# Patient Record
Sex: MALE | Race: WHITE | NOT HISPANIC OR LATINO | URBAN - METROPOLITAN AREA
[De-identification: names, ages, dates, MRNs, and addresses within clinical notes are randomized per-mention and may not be internally consistent; named-entity substitution may affect disease eponyms.]

---

## 2023-02-15 PROBLEM — R73.01 IMPAIRED FASTING GLUCOSE: Status: ACTIVE | Noted: 2023-02-15

## 2023-02-15 PROBLEM — K22.70 BARRETT'S ESOPHAGUS: Status: ACTIVE | Noted: 2023-02-15

## 2023-02-15 PROBLEM — R68.84 JAW PAIN: Status: ACTIVE | Noted: 2023-02-15

## 2023-02-15 PROBLEM — M77.12 LATERAL EPICONDYLITIS OF LEFT ELBOW: Status: ACTIVE | Noted: 2023-02-15

## 2023-02-15 PROBLEM — M27.2: Status: ACTIVE | Noted: 2023-02-15

## 2023-02-15 PROBLEM — M19.90 OA (OSTEOARTHRITIS): Status: ACTIVE | Noted: 2023-02-15

## 2023-02-15 PROBLEM — E66.3 OVERWEIGHT WITH BODY MASS INDEX (BMI) OF 25 TO 25.9 IN ADULT: Status: ACTIVE | Noted: 2023-02-15

## 2023-02-15 PROBLEM — S09.90XA CLOSED HEAD INJURY: Status: ACTIVE | Noted: 2023-02-15

## 2023-02-15 PROBLEM — E78.5 HYPERLIPIDEMIA: Status: ACTIVE | Noted: 2023-02-15

## 2023-02-15 PROBLEM — M25.511 RIGHT SHOULDER PAIN: Status: ACTIVE | Noted: 2023-02-15

## 2023-02-15 RX ORDER — CHOLECALCIFEROL (VITAMIN D3) 25 MCG
2 TABLET ORAL DAILY
COMMUNITY
Start: 2015-07-07

## 2023-02-15 RX ORDER — OMEPRAZOLE 40 MG/1
1 CAPSULE, DELAYED RELEASE ORAL
COMMUNITY
End: 2023-03-30 | Stop reason: SDUPTHER

## 2023-02-15 RX ORDER — OMEGA-3-ACID ETHYL ESTERS 1 G/1
4 CAPSULE, LIQUID FILLED ORAL DAILY
COMMUNITY
Start: 2019-05-15 | End: 2023-04-04 | Stop reason: ALTCHOICE

## 2023-02-15 RX ORDER — IBUPROFEN 600 MG/1
1 TABLET ORAL 2 TIMES DAILY
COMMUNITY
End: 2023-04-04 | Stop reason: ALTCHOICE

## 2023-02-15 RX ORDER — ATORVASTATIN CALCIUM 80 MG/1
1 TABLET, FILM COATED ORAL DAILY
COMMUNITY
Start: 2017-01-09 | End: 2024-02-01 | Stop reason: SDUPTHER

## 2023-02-17 PROBLEM — E03.9 HYPOTHYROIDISM: Status: ACTIVE | Noted: 2023-02-17

## 2023-03-28 ENCOUNTER — TELEPHONE (OUTPATIENT)
Dept: PRIMARY CARE | Facility: CLINIC | Age: 68
End: 2023-03-28

## 2023-03-28 ENCOUNTER — APPOINTMENT (OUTPATIENT)
Dept: PRIMARY CARE | Facility: CLINIC | Age: 68
End: 2023-03-28
Payer: MEDICARE

## 2023-03-28 NOTE — TELEPHONE ENCOUNTER
Patient has medicare wellness appointment on Tuesday 4/4/23 at 3pm and would like to get blood work done on Monday can you order? He would like to get it done so Dr. Pace can review during his appointment.

## 2023-03-30 ENCOUNTER — TELEPHONE (OUTPATIENT)
Dept: PRIMARY CARE | Facility: CLINIC | Age: 68
End: 2023-03-30
Payer: MEDICARE

## 2023-03-30 DIAGNOSIS — K22.70 BARRETT'S ESOPHAGUS WITHOUT DYSPLASIA: ICD-10-CM

## 2023-03-30 RX ORDER — OMEPRAZOLE 40 MG/1
40 CAPSULE, DELAYED RELEASE ORAL
Qty: 90 CAPSULE | Refills: 3 | Status: SHIPPED | OUTPATIENT
Start: 2023-03-30 | End: 2023-04-04 | Stop reason: SDUPTHER

## 2023-04-04 ENCOUNTER — OFFICE VISIT (OUTPATIENT)
Dept: PRIMARY CARE | Facility: CLINIC | Age: 68
End: 2023-04-04
Payer: MEDICARE

## 2023-04-04 ENCOUNTER — APPOINTMENT (OUTPATIENT)
Dept: LAB | Facility: LAB | Age: 68
End: 2023-04-04
Payer: MEDICARE

## 2023-04-04 VITALS
HEART RATE: 92 BPM | TEMPERATURE: 97.3 F | WEIGHT: 169 LBS | SYSTOLIC BLOOD PRESSURE: 130 MMHG | OXYGEN SATURATION: 97 % | DIASTOLIC BLOOD PRESSURE: 69 MMHG | HEIGHT: 69 IN | BODY MASS INDEX: 25.03 KG/M2

## 2023-04-04 DIAGNOSIS — E55.9 VITAMIN D DEFICIENCY: ICD-10-CM

## 2023-04-04 DIAGNOSIS — K22.70 BARRETT'S ESOPHAGUS WITHOUT DYSPLASIA: ICD-10-CM

## 2023-04-04 DIAGNOSIS — N40.0 BENIGN PROSTATIC HYPERPLASIA WITHOUT LOWER URINARY TRACT SYMPTOMS: ICD-10-CM

## 2023-04-04 DIAGNOSIS — R73.01 IMPAIRED FASTING GLUCOSE: ICD-10-CM

## 2023-04-04 DIAGNOSIS — R73.9 HYPERGLYCEMIA: ICD-10-CM

## 2023-04-04 DIAGNOSIS — E78.2 MIXED HYPERLIPIDEMIA: ICD-10-CM

## 2023-04-04 DIAGNOSIS — R25.1 TREMOR: Primary | ICD-10-CM

## 2023-04-04 PROBLEM — S09.90XA CLOSED HEAD INJURY: Status: RESOLVED | Noted: 2023-02-15 | Resolved: 2023-04-04

## 2023-04-04 PROBLEM — R68.84 JAW PAIN: Status: RESOLVED | Noted: 2023-02-15 | Resolved: 2023-04-04

## 2023-04-04 PROBLEM — M25.511 RIGHT SHOULDER PAIN: Status: RESOLVED | Noted: 2023-02-15 | Resolved: 2023-04-04

## 2023-04-04 PROBLEM — M27.2: Status: RESOLVED | Noted: 2023-02-15 | Resolved: 2023-04-04

## 2023-04-04 PROBLEM — M77.12 LATERAL EPICONDYLITIS OF LEFT ELBOW: Status: RESOLVED | Noted: 2023-02-15 | Resolved: 2023-04-04

## 2023-04-04 PROBLEM — E66.3 OVERWEIGHT WITH BODY MASS INDEX (BMI) OF 25 TO 25.9 IN ADULT: Status: RESOLVED | Noted: 2023-02-15 | Resolved: 2023-04-04

## 2023-04-04 LAB
CALCIDIOL (25 OH VITAMIN D3) (NG/ML) IN SER/PLAS: 42 NG/ML
ERYTHROCYTE DISTRIBUTION WIDTH (RATIO) BY AUTOMATED COUNT: 14.2 % (ref 11.5–14.5)
ERYTHROCYTE MEAN CORPUSCULAR HEMOGLOBIN CONCENTRATION (G/DL) BY AUTOMATED: 32.9 G/DL (ref 32–36)
ERYTHROCYTE MEAN CORPUSCULAR VOLUME (FL) BY AUTOMATED COUNT: 95 FL (ref 80–100)
ERYTHROCYTES (10*6/UL) IN BLOOD BY AUTOMATED COUNT: 4.57 X10E12/L (ref 4.5–5.9)
HEMATOCRIT (%) IN BLOOD BY AUTOMATED COUNT: 43.5 % (ref 41–52)
HEMOGLOBIN (G/DL) IN BLOOD: 14.3 G/DL (ref 13.5–17.5)
LEUKOCYTES (10*3/UL) IN BLOOD BY AUTOMATED COUNT: 9 X10E9/L (ref 4.4–11.3)
NRBC (PER 100 WBCS) BY AUTOMATED COUNT: 0 /100 WBC (ref 0–0)
PLATELETS (10*3/UL) IN BLOOD AUTOMATED COUNT: 231 X10E9/L (ref 150–450)
PROSTATE SPECIFIC ANTIGEN,SCREEN: 1.71 NG/ML (ref 0–4)
THYROTROPIN (MIU/L) IN SER/PLAS BY DETECTION LIMIT <= 0.05 MIU/L: 2.8 MIU/L (ref 0.44–3.98)

## 2023-04-04 PROCEDURE — 99497 ADVNCD CARE PLAN 30 MIN: CPT | Performed by: INTERNAL MEDICINE

## 2023-04-04 PROCEDURE — 80061 LIPID PANEL: CPT

## 2023-04-04 PROCEDURE — 84443 ASSAY THYROID STIM HORMONE: CPT

## 2023-04-04 PROCEDURE — G0444 DEPRESSION SCREEN ANNUAL: HCPCS | Performed by: INTERNAL MEDICINE

## 2023-04-04 PROCEDURE — 1157F ADVNC CARE PLAN IN RCRD: CPT | Performed by: INTERNAL MEDICINE

## 2023-04-04 PROCEDURE — 1170F FXNL STATUS ASSESSED: CPT | Performed by: INTERNAL MEDICINE

## 2023-04-04 PROCEDURE — 80053 COMPREHEN METABOLIC PANEL: CPT

## 2023-04-04 PROCEDURE — 90677 PCV20 VACCINE IM: CPT | Performed by: INTERNAL MEDICINE

## 2023-04-04 PROCEDURE — 1160F RVW MEDS BY RX/DR IN RCRD: CPT | Performed by: INTERNAL MEDICINE

## 2023-04-04 PROCEDURE — G0439 PPPS, SUBSEQ VISIT: HCPCS | Performed by: INTERNAL MEDICINE

## 2023-04-04 PROCEDURE — 83036 HEMOGLOBIN GLYCOSYLATED A1C: CPT

## 2023-04-04 PROCEDURE — 1159F MED LIST DOCD IN RCRD: CPT | Performed by: INTERNAL MEDICINE

## 2023-04-04 PROCEDURE — 82306 VITAMIN D 25 HYDROXY: CPT

## 2023-04-04 PROCEDURE — 85027 COMPLETE CBC AUTOMATED: CPT

## 2023-04-04 PROCEDURE — G0009 ADMIN PNEUMOCOCCAL VACCINE: HCPCS | Performed by: INTERNAL MEDICINE

## 2023-04-04 PROCEDURE — 99213 OFFICE O/P EST LOW 20 MIN: CPT | Performed by: INTERNAL MEDICINE

## 2023-04-04 PROCEDURE — G0103 PSA SCREENING: HCPCS

## 2023-04-04 PROCEDURE — 1036F TOBACCO NON-USER: CPT | Performed by: INTERNAL MEDICINE

## 2023-04-04 RX ORDER — AMITRIPTYLINE HYDROCHLORIDE 10 MG/1
10 TABLET, FILM COATED ORAL NIGHTLY
Qty: 30 TABLET | Refills: 11 | Status: SHIPPED | OUTPATIENT
Start: 2023-04-04 | End: 2023-04-06 | Stop reason: SDUPTHER

## 2023-04-04 RX ORDER — OMEPRAZOLE 40 MG/1
40 CAPSULE, DELAYED RELEASE ORAL
Qty: 90 CAPSULE | Refills: 3 | Status: SHIPPED | OUTPATIENT
Start: 2023-04-04 | End: 2023-04-04 | Stop reason: ALTCHOICE

## 2023-04-04 RX ORDER — OMEGA-3 FATTY ACIDS 1000 MG
4000 CAPSULE ORAL
COMMUNITY
Start: 2019-11-25 | End: 2023-09-08 | Stop reason: ALTCHOICE

## 2023-04-04 ASSESSMENT — ACTIVITIES OF DAILY LIVING (ADL)
BATHING: INDEPENDENT
DRESSING: INDEPENDENT
DOING_HOUSEWORK: INDEPENDENT
MANAGING_FINANCES: INDEPENDENT
GROCERY_SHOPPING: INDEPENDENT
TAKING_MEDICATION: INDEPENDENT

## 2023-04-04 ASSESSMENT — ENCOUNTER SYMPTOMS
LOSS OF SENSATION IN FEET: 0
OCCASIONAL FEELINGS OF UNSTEADINESS: 0
DEPRESSION: 0

## 2023-04-04 ASSESSMENT — PATIENT HEALTH QUESTIONNAIRE - PHQ9
2. FEELING DOWN, DEPRESSED OR HOPELESS: NOT AT ALL
1. LITTLE INTEREST OR PLEASURE IN DOING THINGS: NOT AT ALL
2. FEELING DOWN, DEPRESSED OR HOPELESS: NOT AT ALL
SUM OF ALL RESPONSES TO PHQ9 QUESTIONS 1 AND 2: 0
SUM OF ALL RESPONSES TO PHQ9 QUESTIONS 1 AND 2: 0
1. LITTLE INTEREST OR PLEASURE IN DOING THINGS: NOT AT ALL

## 2023-04-04 NOTE — PROGRESS NOTES
Assessment and Plan:  Problem List Items Addressed This Visit          Digestive    Bartholomew's esophagus    Current Assessment & Plan     Continue Tums Mylanta omeprazole GI evaluation for H. pylori         Relevant Orders    CBC    Comprehensive Metabolic Panel       Endocrine/Metabolic    Impaired fasting glucose    Current Assessment & Plan     Low-carb diet check hemoglobin A1c         Relevant Orders    Comprehensive Metabolic Panel       Other    Hyperlipidemia    Current Assessment & Plan     Low-fat diet Lipitor         Relevant Orders    Comprehensive Metabolic Panel    Lipid Panel    Hemoglobin A1C    Tremor - Primary    Current Assessment & Plan     Multifactorial essential advised reviewed  Yoga exercise amitriptyline         Relevant Medications    amitriptyline (Elavil) 10 mg tablet    Other Relevant Orders    Comprehensive Metabolic Panel    TSH with reflex to Free T4 if abnormal     Other Visit Diagnoses       Vitamin D deficiency        Relevant Orders    Vitamin D, Total    Benign prostatic hyperplasia without lower urinary tract symptoms        Relevant Orders    Prostate Specific Antigen, Screen    Hyperglycemia        Relevant Orders    Hemoglobin A1C            Chief Complaint:   Medicare Wellness Exam/Comprehensive Problem Focused Follow Up and Physical Exam    HPI:  This is a 67-year-old patient  without any children does not smoke does not drink history of hypertension hyperlipidemia osteopenia osteoarthritis gastritis complaining of the tremor both upper extremity more on the left compared to right onset gradually duration 3 months progressed gradually aggravating factors stress underlying neuropathy Parkinson or anxiety cannot be rule out    Negative for multiple sclerosis    Negative for injury to the head or spine    Negative for fall    Negative for suicide    Negative for COVID-19  Active Problem List  Patient Active Problem List   Diagnosis    Bartholomew's esophagus     Hyperlipidemia    Impaired fasting glucose    OA (osteoarthritis)    Tremor       Comprehensive Medical/Surgical/Social/Family History  Past Medical History:   Diagnosis Date    Anesthesia of skin 02/18/2020    Numbness and tingling    Cellulitis of left finger 01/17/2022    Paronychia of left thumb    Disorder of thyroid, unspecified 06/01/2020    Thyroid mass    Encounter for immunization     Encounter for immunization    Encounter for other preprocedural examination 11/21/2019    Encounter for preadmission testing    Encounter for screening for cardiovascular disorders 05/13/2019    Encounter for abdominal aortic aneurysm (AAA) screening    Essential (primary) hypertension 07/23/2021    Benign essential hypertension    Generalized anxiety disorder 07/23/2021    ALVERTO (generalized anxiety disorder)    Local infection of the skin and subcutaneous tissue, unspecified 01/17/2022    Infection of thumb    Other chest pain 11/13/2018    Atypical chest pain    Personal history of other diseases of the digestive system 07/23/2021    History of colitis    Personal history of other diseases of the digestive system 12/17/2019    History of bilateral inguinal hernias    Personal history of other diseases of the digestive system 02/18/2020    History of diverticulitis of colon    Personal history of other diseases of the digestive system 01/17/2022    History of gastroesophageal reflux (GERD)    Personal history of other diseases of the musculoskeletal system and connective tissue 11/16/2018    History of muscle spasm    Personal history of other diseases of the respiratory system 02/18/2020    History of upper respiratory infection    Personal history of other diseases of the respiratory system     History of influenza    Personal history of other endocrine, nutritional and metabolic disease 02/18/2021    History of vitamin D deficiency    Personal history of other infectious and parasitic diseases 04/21/2020    History of  herpes zoster    Personal history of other mental and behavioral disorders 01/17/2022    History of anxiety disorder    Personal history of other specified conditions 08/03/2020    History of weight gain    Personal history of other specified conditions 05/15/2019    History of abnormal weight loss    Personal history of urinary calculi 02/18/2020    History of renal calculi    Sciatica, right side 09/10/2015    Right sided sciatica     Past Surgical History:   Procedure Laterality Date    OTHER SURGICAL HISTORY  11/04/2019    Colonoscopy    OTHER SURGICAL HISTORY  11/04/2019    Esophagogastroduodenoscopy    OTHER SURGICAL HISTORY  12/01/2022    Thyroid surgery    OTHER SURGICAL HISTORY  12/17/2019    Inguinal hernia repair     Social History     Social History Narrative    Not on file     Tobacco/Alcohol/Opioid use, as well as Illicit Drug Use was screened for/reviewed and documented in Social Documentation section of the chart and medication list as appropriate    Allergies and Medications  Patient has no known allergies.  Current Outpatient Medications   Medication Instructions    amitriptyline (ELAVIL) 10 mg, oral, Nightly    atorvastatin (Lipitor) 80 mg tablet 1 tablet, oral, Daily    cholecalciferol (Vitamin D-3) 25 MCG (1000 UT) tablet 2 tablets, oral, Daily    multivit with minerals/lutein (MULTIVITAMIN 50 PLUS ORAL) 1 tabs, ORAL, DAILY, in the afternoon, # 100 tabs, Refill(s) 0, Date: 06/03/2020 12:31:00 EDT    omega-3 4,000 mg     Medications and Supplements  prescribed by me and other practitioners or clinical pharmacist (such as prescriptions, OTC's, herbal therapies and supplements) were reviewed and documented in the medical record.          Advance directives  Advanced Care Planning (including a Living Will, Healthcare POA, as well as specific end of life choices and/or directives), was discussed for approximately 16 minutes with the patient and/or surrogate, voluntarily, and documented in the Problem  "List of the medical record.     Cardiac Risk Assessment  Cardiovascular risk was discussed and, if needed, lifestyle modifications recommended, including nutritional choices, exercise, and elimination of habits contributing to risk. We agreed on a plan to reduce the current cardiovascular risk based on above discussion as needed.  Aspirin use/disuse was discussed and documented in the Problem List of the medical record after reviewing the updated guidelines below:    Consider low dose Aspirin ( mg) use if the benefit for cardiovascular disease prevention outweighs risk for bleeding complications.   In general, low dose ASA should be considered:  In patients WITHOUT prior MI/stroke/PAD (primary prevention):   a. Age <60: Use if 10-year cardiovascular disease risk >20%, with discussion of risks and benefits with patient  b. Age 60-<70: Use if 10-year cardiovascular disease risk >20% and low bleeding (e.g., gastrointenstinal) risk, with discussion of risks and benefits with patient  c. Age >=70: Do not use    In patients WITH prior MI/stroke/PAD (secondary prevention):   Generally use unless extremely high bleeding (e.g., gastrointenstinal) risk, with discussion of risks and benefits with patient    ROS otherwise negative aside from what was mentioned above in HPI.    Vitals  /69 (BP Location: Right arm, Patient Position: Sitting, BP Cuff Size: Adult)   Pulse 92   Temp 36.3 °C (97.3 °F)   Ht 1.753 m (5' 9\")   Wt 76.7 kg (169 lb)   SpO2 97%   BMI 24.96 kg/m²   Body mass index is 24.96 kg/m².  Physical Exam  Gen: Alert, NAD  HEENT:  Unremarkable  Neck:  No FIDE  Respiratory:  Lungs CTAB  Cardiovascular:  Heart RRR  Neuro: Tremor of both upper extremity  Skin:  No suspicious lesions present  Breast: No masses, or axillary lymphadenopathy    During the course of the visit the patient was educated and counseled about age appropriate screening and preventive services. Completed preventive screenings were " documented in the chart and orders were placed for outstanding screenings/procedures as documented in the Assessment and Plan.    Patient Instructions (the written plan) was given to the patient at check out.

## 2023-04-05 LAB
ALANINE AMINOTRANSFERASE (SGPT) (U/L) IN SER/PLAS: 30 U/L (ref 10–52)
ALBUMIN (G/DL) IN SER/PLAS: 4.5 G/DL (ref 3.4–5)
ALKALINE PHOSPHATASE (U/L) IN SER/PLAS: 72 U/L (ref 33–136)
ANION GAP IN SER/PLAS: 18 MMOL/L (ref 10–20)
ASPARTATE AMINOTRANSFERASE (SGOT) (U/L) IN SER/PLAS: 20 U/L (ref 9–39)
BILIRUBIN TOTAL (MG/DL) IN SER/PLAS: 0.9 MG/DL (ref 0–1.2)
CALCIUM (MG/DL) IN SER/PLAS: 10.6 MG/DL (ref 8.6–10.6)
CARBON DIOXIDE, TOTAL (MMOL/L) IN SER/PLAS: 27 MMOL/L (ref 21–32)
CHLORIDE (MMOL/L) IN SER/PLAS: 100 MMOL/L (ref 98–107)
CHOLESTEROL (MG/DL) IN SER/PLAS: 217 MG/DL (ref 0–199)
CHOLESTEROL IN HDL (MG/DL) IN SER/PLAS: 40.7 MG/DL
CHOLESTEROL/HDL RATIO: 5.3
CREATININE (MG/DL) IN SER/PLAS: 0.82 MG/DL (ref 0.5–1.3)
ESTIMATED AVERAGE GLUCOSE FOR HBA1C: 108 MG/DL
GFR MALE: >90 ML/MIN/1.73M2
GLUCOSE (MG/DL) IN SER/PLAS: 96 MG/DL (ref 74–99)
HEMOGLOBIN A1C/HEMOGLOBIN TOTAL IN BLOOD: 5.4 %
LDL: ABNORMAL MG/DL (ref 0–99)
NON HDL CHOLESTEROL: 176 MG/DL
POTASSIUM (MMOL/L) IN SER/PLAS: 4.3 MMOL/L (ref 3.5–5.3)
PROTEIN TOTAL: 6.8 G/DL (ref 6.4–8.2)
SODIUM (MMOL/L) IN SER/PLAS: 141 MMOL/L (ref 136–145)
TRIGLYCERIDE (MG/DL) IN SER/PLAS: 449 MG/DL (ref 0–149)
UREA NITROGEN (MG/DL) IN SER/PLAS: 15 MG/DL (ref 6–23)
VLDL: ABNORMAL MG/DL (ref 0–40)

## 2023-04-06 DIAGNOSIS — K22.70 BARRETT'S ESOPHAGUS WITHOUT DYSPLASIA: ICD-10-CM

## 2023-04-06 DIAGNOSIS — R25.1 TREMOR: ICD-10-CM

## 2023-04-06 RX ORDER — AMITRIPTYLINE HYDROCHLORIDE 10 MG/1
10 TABLET, FILM COATED ORAL NIGHTLY
Qty: 30 TABLET | Refills: 11 | Status: SHIPPED | OUTPATIENT
Start: 2023-04-06 | End: 2023-06-27

## 2023-04-06 RX ORDER — OMEPRAZOLE 40 MG/1
40 CAPSULE, DELAYED RELEASE ORAL
Qty: 90 CAPSULE | Refills: 3 | Status: SHIPPED | OUTPATIENT
Start: 2023-04-06

## 2023-04-06 NOTE — TELEPHONE ENCOUNTER
----- Message from Gabriel Pace MD sent at 4/5/2023 11:43 AM EDT -----  Cholesterol 217 triglyceride 449 advised Crestor 20 mg a day #90 follow-up 3 months

## 2023-06-27 ENCOUNTER — OFFICE VISIT (OUTPATIENT)
Dept: PRIMARY CARE | Facility: CLINIC | Age: 68
End: 2023-06-27
Payer: MEDICARE

## 2023-06-27 VITALS
TEMPERATURE: 97.1 F | DIASTOLIC BLOOD PRESSURE: 73 MMHG | HEART RATE: 75 BPM | HEIGHT: 69 IN | SYSTOLIC BLOOD PRESSURE: 123 MMHG | BODY MASS INDEX: 25.03 KG/M2 | WEIGHT: 169 LBS | OXYGEN SATURATION: 97 %

## 2023-06-27 DIAGNOSIS — E78.2 MIXED HYPERLIPIDEMIA: Primary | ICD-10-CM

## 2023-06-27 DIAGNOSIS — R25.1 TREMOR: ICD-10-CM

## 2023-06-27 DIAGNOSIS — K22.710 BARRETT'S ESOPHAGUS WITH LOW GRADE DYSPLASIA: ICD-10-CM

## 2023-06-27 DIAGNOSIS — R73.01 IMPAIRED FASTING GLUCOSE: ICD-10-CM

## 2023-06-27 DIAGNOSIS — R73.9 HYPERGLYCEMIA: ICD-10-CM

## 2023-06-27 PROCEDURE — 1159F MED LIST DOCD IN RCRD: CPT | Performed by: INTERNAL MEDICINE

## 2023-06-27 PROCEDURE — 1157F ADVNC CARE PLAN IN RCRD: CPT | Performed by: INTERNAL MEDICINE

## 2023-06-27 PROCEDURE — 99213 OFFICE O/P EST LOW 20 MIN: CPT | Performed by: INTERNAL MEDICINE

## 2023-06-27 PROCEDURE — 1160F RVW MEDS BY RX/DR IN RCRD: CPT | Performed by: INTERNAL MEDICINE

## 2023-06-27 PROCEDURE — 1036F TOBACCO NON-USER: CPT | Performed by: INTERNAL MEDICINE

## 2023-06-27 NOTE — PROGRESS NOTES
Subjective   Patient ID: Gerald Castro is a 67 y.o. male who presents for Follow-up.    Assessment/Plan     Problem List Items Addressed This Visit          Digestive    Bartholomew's esophagus     EGD surveillance with GI            Endocrine/Metabolic    Impaired fasting glucose     Low-carb low-fat diet exercise statin therapy            Other    Hyperlipidemia - Primary     Continue Lipitor fish oil check LFT CPK         Relevant Orders    Hemoglobin A1C    Lipid Panel    Tremor     Amitriptyline does not help advised B12 folic acid thiamine alcohol.  Reviewed           Hyperglycemia    Relevant Orders    Hemoglobin A1C    Lipid Panel   Patient was evaluated today, problem list was reviewed, problems and concerns addressed, Rx list reviewed and updated, lab and tests were noted and reviewed. Life style changes were discussed, always it works better if we eat plant based diet and plenty of fibres and roughage. Consume adequate amount of water and avoid alcohol, light to moderate physical activities and stress reduction are always beneficial for ongoing physical well being. Do not forget to have 6 to 7 hours of sleep regularly and avoid late night dee screen exposure.      HPI 67-year-old patient have metabolic syndrome hyperglycemia hyperlipidemia gastritis chronic back pain chronic esophagitis continue tremor advised to try amitriptyline B12 folic acid thiamine not better advised to try the B12 folic acid thiamine and red wine problems continue advised to see neurology    No Known Allergies    Current Outpatient Medications   Medication Sig Dispense Refill    atorvastatin (Lipitor) 80 mg tablet Take 1 tablet (80 mg) by mouth once daily.      cholecalciferol (Vitamin D-3) 25 MCG (1000 UT) tablet Take 2 tablets (50 mcg) by mouth once daily.      multivit with minerals/lutein (MULTIVITAMIN 50 PLUS ORAL) 1 tabs, ORAL, DAILY, in the afternoon, # 100 tabs, Refill(s) 0, Date: 06/03/2020 12:31:00 EDT      omega-3 1,000  "mg capsule capsule 4 capsules (4,000 mg).      omeprazole (PriLOSEC) 40 mg DR capsule Take 1 capsule (40 mg) by mouth 4 times a week. 90 capsule 3     No current facility-administered medications for this visit.       Objective   Visit Vitals  /73 (BP Location: Left arm, Patient Position: Sitting, BP Cuff Size: Adult)   Pulse 75   Temp 36.2 °C (97.1 °F)   Ht 1.753 m (5' 9\")   Wt 76.7 kg (169 lb)   SpO2 97%   BMI 24.96 kg/m²   Smoking Status Former   BSA 1.93 m²     Physical Exam  Constitutional:       General: He is not in acute distress.     Appearance: Normal appearance.   HENT:      Head: Normocephalic and atraumatic.      Nose: Nose normal.   Eyes:      Extraocular Movements: Extraocular movements intact.      Conjunctiva/sclera: Conjunctivae normal.   Cardiovascular:      Rate and Rhythm: Normal rate and regular rhythm.   Pulmonary:      Effort: Pulmonary effort is normal.      Breath sounds: Normal breath sounds.   Skin:     General: Skin is warm.   Neurological:      Idiopathic tremor.   Psychiatric:         Generalized anxiety  Immunization History   Administered Date(s) Administered    Influenza, High Dose Seasonal, Preservative Free 10/13/2016, 10/17/2016    Influenza, High-dose Seasonal, Quadrivalent, Preservative Free 09/05/2021, 09/04/2022    Influenza, Injectable, MDCK, preservative free 10/17/2017    Influenza, injectable, quadrivalent, preservative free 09/11/2018, 09/17/2019    Influenza, seasonal, injectable 09/22/2013, 09/09/2014, 09/04/2022    Influenza, seasonal, injectable, preservative free 10/14/2008, 09/08/2015, 08/30/2016, 09/01/2020    Moderna SARS-CoV-2 Vaccination 01/12/2021, 02/10/2021, 02/12/2021, 03/12/2021, 11/02/2021, 04/07/2022, 09/11/2022    Moderna Sars-cov-2 Bivalent Booster 09/11/2022    Pneumococcal Conjugate Pcv 20 04/04/2023    Pneumococcal Polysaccharide PPSV23 02/18/2020       Review of Systems    Office Visit on 04/04/2023   Component Date Value Ref Range Status    " WBC 04/04/2023 9.0  4.4 - 11.3 x10E9/L Final    nRBC 04/04/2023 0.0  0.0 - 0.0 /100 WBC Final    RBC 04/04/2023 4.57  4.50 - 5.90 x10E12/L Final    Hemoglobin 04/04/2023 14.3  13.5 - 17.5 g/dL Final    Hematocrit 04/04/2023 43.5  41.0 - 52.0 % Final    MCV 04/04/2023 95  80 - 100 fL Final    MCHC 04/04/2023 32.9  32.0 - 36.0 g/dL Final    Platelets 04/04/2023 231  150 - 450 x10E9/L Final    RDW 04/04/2023 14.2  11.5 - 14.5 % Final    Glucose 04/04/2023 96  74 - 99 mg/dL Final    Sodium 04/04/2023 141  136 - 145 mmol/L Final    Potassium 04/04/2023 4.3  3.5 - 5.3 mmol/L Final    Chloride 04/04/2023 100  98 - 107 mmol/L Final    Bicarbonate 04/04/2023 27  21 - 32 mmol/L Final    Anion Gap 04/04/2023 18  10 - 20 mmol/L Final    Urea Nitrogen 04/04/2023 15  6 - 23 mg/dL Final    Creatinine 04/04/2023 0.82  0.50 - 1.30 mg/dL Final    GFR MALE 04/04/2023 >90  >90 mL/min/1.73m2 Final    Calcium 04/04/2023 10.6  8.6 - 10.6 mg/dL Final    Albumin 04/04/2023 4.5  3.4 - 5.0 g/dL Final    Alkaline Phosphatase 04/04/2023 72  33 - 136 U/L Final    Total Protein 04/04/2023 6.8  6.4 - 8.2 g/dL Final    AST 04/04/2023 20  9 - 39 U/L Final    Total Bilirubin 04/04/2023 0.9  0.0 - 1.2 mg/dL Final    ALT (SGPT) 04/04/2023 30  10 - 52 U/L Final    Cholesterol 04/04/2023 217 (H)  0 - 199 mg/dL Final    HDL 04/04/2023 40.7  mg/dL Final    Cholesterol/HDL Ratio 04/04/2023 5.3 (A)   Final    LDL 04/04/2023 -  0 - 99 mg/dL Final    VLDL 04/04/2023 SEE COMMENT  0 - 40 mg/dL Final    Triglycerides 04/04/2023 449 (H)  0 - 149 mg/dL Final    Non HDL Cholesterol 04/04/2023 176  mg/dL Final    TSH 04/04/2023 2.80  0.44 - 3.98 mIU/L Final    Vitamin D, 25-Hydroxy 04/04/2023 42  ng/mL Final    Prostate Specific Antigen,Screen 04/04/2023 1.71  0.00 - 4.00 ng/mL Final    Hemoglobin A1C 04/04/2023 5.4  % Final    Estimated Average Glucose 04/04/2023 108  MG/DL Final       Radiology: Reviewed imaging in powerchart.  No results found.    Family History    Problem Relation Name Age of Onset    Heart disease Mother      Hypertension Mother      Other (agent orange) Father       Social History     Socioeconomic History    Marital status: Single     Spouse name: None    Number of children: None    Years of education: None    Highest education level: None   Occupational History    None   Tobacco Use    Smoking status: Former     Types: Cigarettes     Quit date: 3/4/2019     Years since quittin.3    Smokeless tobacco: Never   Substance and Sexual Activity    Alcohol use: Never    Drug use: Defer    Sexual activity: None   Other Topics Concern    None   Social History Narrative    None     Social Determinants of Health     Financial Resource Strain: Not on file   Food Insecurity: Not on file   Transportation Needs: Not on file   Physical Activity: Not on file   Stress: Not on file   Social Connections: Not on file   Intimate Partner Violence: Not on file   Housing Stability: Not on file     Past Medical History:   Diagnosis Date    Anesthesia of skin 2020    Numbness and tingling    Cellulitis of left finger 2022    Paronychia of left thumb    Disorder of thyroid, unspecified 2020    Thyroid mass    Encounter for immunization     Encounter for immunization    Encounter for other preprocedural examination 2019    Encounter for preadmission testing    Encounter for screening for cardiovascular disorders 2019    Encounter for abdominal aortic aneurysm (AAA) screening    Essential (primary) hypertension 2021    Benign essential hypertension    Generalized anxiety disorder 2021    ALVERTO (generalized anxiety disorder)    Local infection of the skin and subcutaneous tissue, unspecified 2022    Infection of thumb    Other chest pain 2018    Atypical chest pain    Personal history of other diseases of the digestive system 2021    History of colitis    Personal history of other diseases of the digestive system 2019     History of bilateral inguinal hernias    Personal history of other diseases of the digestive system 02/18/2020    History of diverticulitis of colon    Personal history of other diseases of the digestive system 01/17/2022    History of gastroesophageal reflux (GERD)    Personal history of other diseases of the musculoskeletal system and connective tissue 11/16/2018    History of muscle spasm    Personal history of other diseases of the respiratory system 02/18/2020    History of upper respiratory infection    Personal history of other diseases of the respiratory system     History of influenza    Personal history of other endocrine, nutritional and metabolic disease 02/18/2021    History of vitamin D deficiency    Personal history of other infectious and parasitic diseases 04/21/2020    History of herpes zoster    Personal history of other mental and behavioral disorders 01/17/2022    History of anxiety disorder    Personal history of other specified conditions 08/03/2020    History of weight gain    Personal history of other specified conditions 05/15/2019    History of abnormal weight loss    Personal history of urinary calculi 02/18/2020    History of renal calculi    Sciatica, right side 09/10/2015    Right sided sciatica     Past Surgical History:   Procedure Laterality Date    OTHER SURGICAL HISTORY  11/04/2019    Colonoscopy    OTHER SURGICAL HISTORY  11/04/2019    Esophagogastroduodenoscopy    OTHER SURGICAL HISTORY  12/01/2022    Thyroid surgery    OTHER SURGICAL HISTORY  12/17/2019    Inguinal hernia repair       Charting was completed using voice recognition technology and may include unintended errors.

## 2023-07-11 ENCOUNTER — TELEPHONE (OUTPATIENT)
Dept: PRIMARY CARE | Facility: CLINIC | Age: 68
End: 2023-07-11
Payer: MEDICARE

## 2023-07-21 ENCOUNTER — APPOINTMENT (OUTPATIENT)
Dept: PRIMARY CARE | Facility: CLINIC | Age: 68
End: 2023-07-21
Payer: MEDICARE

## 2023-08-04 ENCOUNTER — TELEPHONE (OUTPATIENT)
Dept: PRIMARY CARE | Facility: CLINIC | Age: 68
End: 2023-08-04

## 2023-08-04 ENCOUNTER — OFFICE VISIT (OUTPATIENT)
Dept: PRIMARY CARE | Facility: CLINIC | Age: 68
End: 2023-08-04
Payer: MEDICARE

## 2023-08-04 VITALS
SYSTOLIC BLOOD PRESSURE: 116 MMHG | OXYGEN SATURATION: 97 % | DIASTOLIC BLOOD PRESSURE: 65 MMHG | HEIGHT: 69 IN | HEART RATE: 93 BPM | WEIGHT: 165 LBS | BODY MASS INDEX: 24.44 KG/M2 | TEMPERATURE: 97.4 F

## 2023-08-04 DIAGNOSIS — K43.6 VENTRAL HERNIA WITH OBSTRUCTION AND WITHOUT GANGRENE: ICD-10-CM

## 2023-08-04 DIAGNOSIS — R25.1 TREMOR: ICD-10-CM

## 2023-08-04 DIAGNOSIS — M19.90 ARTHRITIS: Primary | ICD-10-CM

## 2023-08-04 DIAGNOSIS — M77.12 EPICONDYLITIS, LATERAL, LEFT: ICD-10-CM

## 2023-08-04 PROBLEM — K43.9 VENTRAL HERNIA: Status: ACTIVE | Noted: 2023-08-04

## 2023-08-04 PROCEDURE — 1159F MED LIST DOCD IN RCRD: CPT | Performed by: INTERNAL MEDICINE

## 2023-08-04 PROCEDURE — 1157F ADVNC CARE PLAN IN RCRD: CPT | Performed by: INTERNAL MEDICINE

## 2023-08-04 PROCEDURE — 96372 THER/PROPH/DIAG INJ SC/IM: CPT | Performed by: INTERNAL MEDICINE

## 2023-08-04 PROCEDURE — 1036F TOBACCO NON-USER: CPT | Performed by: INTERNAL MEDICINE

## 2023-08-04 PROCEDURE — 99214 OFFICE O/P EST MOD 30 MIN: CPT | Performed by: INTERNAL MEDICINE

## 2023-08-04 PROCEDURE — 1160F RVW MEDS BY RX/DR IN RCRD: CPT | Performed by: INTERNAL MEDICINE

## 2023-08-04 RX ORDER — TRIAMCINOLONE ACETONIDE 40 MG/ML
40 INJECTION, SUSPENSION INTRA-ARTICULAR; INTRAMUSCULAR ONCE
Status: COMPLETED | OUTPATIENT
Start: 2023-08-04 | End: 2023-08-04

## 2023-08-04 RX ORDER — INDOMETHACIN 50 MG/1
CAPSULE ORAL
Qty: 10 CAPSULE | Refills: 0 | Status: SHIPPED | OUTPATIENT
Start: 2023-08-04 | End: 2023-09-08 | Stop reason: ALTCHOICE

## 2023-08-04 RX ADMIN — TRIAMCINOLONE ACETONIDE 40 MG: 40 INJECTION, SUSPENSION INTRA-ARTICULAR; INTRAMUSCULAR at 14:11

## 2023-08-04 NOTE — PROGRESS NOTES
Subjective   Patient ID: Gerald Castro is a 67 y.o. male who presents for Establish Care (Lump in stomach for awhile now /Lump left elbow still - xrays were done already still painful ).    Assessment/Plan     Problem List Items Addressed This Visit       Arthritis - Primary    Relevant Orders    Rheumatoid Factor    ANURAG without Reflex HENNA    C-Reactive Protein    Sedimentation Rate    Anti-DNA Antibody, Double-Stranded    Epicondylitis, lateral, left       Kenalog 40 mg injection to the left elbow without any complication         Tremor       Continue alcohol amitriptyline B12 folic acid         Ventral hernia     Surgical evaluation for possible surgery advised no weight lifting        Patient was evaluated today, problem list was reviewed, problems and concerns addressed, Rx list reviewed and updated, lab and tests were noted and reviewed. Life style changes were discussed, always it works better if we eat plant based diet and plenty of fibres and roughage. Consume adequate amount of water and avoid alcohol, light to moderate physical activities and stress reduction are always beneficial for ongoing physical well being. Do not forget to have 6 to 7 hours of sleep regularly and avoid late night dee screen exposure.      HPI this is a 67-year-old patient who had a history of hypertension hyperlipidemia and gastritis complaining of the acute on chronic left elbow pain onset gradually duration few weeks to months progress slowly aggravated by activity he is a left-handed person    Negative radiation of the pain to the wrist or elbow or weakness    Also complaining of the ventral hernia onset gradually duration few months progressed slowly aggravated by the lifting pulling pushing.  Negative for nausea vomiting diarrhea constipation  Past Medical History:   Diagnosis Date    Anesthesia of skin 02/18/2020    Numbness and tingling    Cellulitis of left finger 01/17/2022    Paronychia of left thumb    Disorder of  thyroid, unspecified 06/01/2020    Thyroid mass    Encounter for immunization     Encounter for immunization    Encounter for other preprocedural examination 11/21/2019    Encounter for preadmission testing    Encounter for screening for cardiovascular disorders 05/13/2019    Encounter for abdominal aortic aneurysm (AAA) screening    Essential (primary) hypertension 07/23/2021    Benign essential hypertension    Generalized anxiety disorder 07/23/2021    ALVERTO (generalized anxiety disorder)    Local infection of the skin and subcutaneous tissue, unspecified 01/17/2022    Infection of thumb    Other chest pain 11/13/2018    Atypical chest pain    Personal history of other diseases of the digestive system 07/23/2021    History of colitis    Personal history of other diseases of the digestive system 12/17/2019    History of bilateral inguinal hernias    Personal history of other diseases of the digestive system 02/18/2020    History of diverticulitis of colon    Personal history of other diseases of the digestive system 01/17/2022    History of gastroesophageal reflux (GERD)    Personal history of other diseases of the musculoskeletal system and connective tissue 11/16/2018    History of muscle spasm    Personal history of other diseases of the respiratory system 02/18/2020    History of upper respiratory infection    Personal history of other diseases of the respiratory system     History of influenza    Personal history of other endocrine, nutritional and metabolic disease 02/18/2021    History of vitamin D deficiency    Personal history of other infectious and parasitic diseases 04/21/2020    History of herpes zoster    Personal history of other mental and behavioral disorders 01/17/2022    History of anxiety disorder    Personal history of other specified conditions 08/03/2020    History of weight gain    Personal history of other specified conditions 05/15/2019    History of abnormal weight loss    Personal history  of urinary calculi 2020    History of renal calculi    Sciatica, right side 09/10/2015    Right sided sciatica     Past Surgical History:   Procedure Laterality Date    OTHER SURGICAL HISTORY  2019    Colonoscopy    OTHER SURGICAL HISTORY  2019    Esophagogastroduodenoscopy    OTHER SURGICAL HISTORY  2022    Thyroid surgery    OTHER SURGICAL HISTORY  2019    Inguinal hernia repair     No Known Allergies  Current Outpatient Medications   Medication Sig Dispense Refill    atorvastatin (Lipitor) 80 mg tablet Take 1 tablet (80 mg) by mouth once daily.      cholecalciferol (Vitamin D-3) 25 MCG (1000 UT) tablet Take 2 tablets (50 mcg) by mouth once daily.      multivit with minerals/lutein (MULTIVITAMIN 50 PLUS ORAL) 1 tabs, ORAL, DAILY, in the afternoon, # 100 tabs, Refill(s) 0, Date: 2020 12:31:00 EDT      omega-3 1,000 mg capsule capsule 4 capsules (4,000 mg).      omeprazole (PriLOSEC) 40 mg DR capsule Take 1 capsule (40 mg) by mouth 4 times a week. 90 capsule 3     No current facility-administered medications for this visit.     Family History   Problem Relation Name Age of Onset    Heart disease Mother      Hypertension Mother      Other (agent orange) Father       Social History     Socioeconomic History    Marital status: Single     Spouse name: None    Number of children: None    Years of education: None    Highest education level: None   Occupational History    None   Tobacco Use    Smoking status: Former     Types: Cigarettes     Quit date: 3/4/2019     Years since quittin.4    Smokeless tobacco: Never   Substance and Sexual Activity    Alcohol use: Never    Drug use: Defer    Sexual activity: None   Other Topics Concern    None   Social History Narrative    None     Social Determinants of Health     Financial Resource Strain: Not on file   Food Insecurity: Not on file   Transportation Needs: Not on file   Physical Activity: Not on file   Stress: Not on file   Social  "Connections: Not on file   Intimate Partner Violence: Not on file   Housing Stability: Not on file     Immunization History   Administered Date(s) Administered    Flu vaccine (IIV4), preservative free *Check age/dose* 09/11/2018, 09/17/2019    Flu vaccine, quadrivalent, high-dose, preservative free, age 65y+ (FLUZONE) 09/05/2021, 09/04/2022    Influenza, High Dose Seasonal, Preservative Free 10/13/2016, 10/17/2016    Influenza, Injectable, MDCK, preservative free 10/17/2017    Influenza, seasonal, injectable 09/22/2013, 09/09/2014, 09/04/2022    Influenza, seasonal, injectable, preservative free 10/14/2008, 09/08/2015, 08/30/2016, 09/01/2020    Moderna COVID-19 vaccine, bivalent, blue cap/gray label *Check age/dose* 09/11/2022    Moderna SARS-CoV-2 Vaccination 01/12/2021, 02/10/2021, 02/12/2021, 03/12/2021, 11/02/2021, 04/07/2022, 09/11/2022    Pneumococcal conjugate vaccine, 20-valent, adult (PREVNAR 20) 04/04/2023    Pneumococcal polysaccharide vaccine, 23-valent, age 2 years and older (PNEUMOVAX 23) 02/18/2020       Review of Systems  Review of systems is otherwise negative unless stated above or in history of present illness.    Objective   Visit Vitals  /65 (BP Location: Right arm, Patient Position: Sitting, BP Cuff Size: Adult)   Pulse 93   Temp 36.3 °C (97.4 °F)   Ht 1.753 m (5' 9\")   Wt 74.8 kg (165 lb)   SpO2 97%   BMI 24.37 kg/m²   Smoking Status Former   BSA 1.91 m²     Physical Exam  Constitutional:       General: not in acute distress.   HENT:      Head: Normocephalic and atraumatic.      Nose: Nose normal.   Eyes:      Extraocular Movements: Extraocular movements intact.      Conjunctiva/sclera: Conjunctivae normal.   Cardiovascular:      Rate and Rhythm: Normal rate ,  No M/R/G  Pulmonary:      Effort: Pulmonary effort is normal.      Breath sounds: Normal, Bilat Equal AE  Skin:     General: Skin is warm.   Neurological:      Mental Status: He is alert and oriented to person, place, and time. "   Psychiatric:         Mood and Affect: Mood normal.         Behavior: Behavior normal.   Musculoskeletal left elbow lateral epicondylitis tenderness or swelling  FROM in all extremitirs,  Joint-no swelling or tenderness  Abdomen examination ventral hernia nontender    No visits with results within 4 Month(s) from this visit.   Latest known visit with results is:   Office Visit on 04/04/2023   Component Date Value Ref Range Status    WBC 04/04/2023 9.0  4.4 - 11.3 x10E9/L Final    nRBC 04/04/2023 0.0  0.0 - 0.0 /100 WBC Final    RBC 04/04/2023 4.57  4.50 - 5.90 x10E12/L Final    Hemoglobin 04/04/2023 14.3  13.5 - 17.5 g/dL Final    Hematocrit 04/04/2023 43.5  41.0 - 52.0 % Final    MCV 04/04/2023 95  80 - 100 fL Final    MCHC 04/04/2023 32.9  32.0 - 36.0 g/dL Final    Platelets 04/04/2023 231  150 - 450 x10E9/L Final    RDW 04/04/2023 14.2  11.5 - 14.5 % Final    Glucose 04/04/2023 96  74 - 99 mg/dL Final    Sodium 04/04/2023 141  136 - 145 mmol/L Final    Potassium 04/04/2023 4.3  3.5 - 5.3 mmol/L Final    Chloride 04/04/2023 100  98 - 107 mmol/L Final    Bicarbonate 04/04/2023 27  21 - 32 mmol/L Final    Anion Gap 04/04/2023 18  10 - 20 mmol/L Final    Urea Nitrogen 04/04/2023 15  6 - 23 mg/dL Final    Creatinine 04/04/2023 0.82  0.50 - 1.30 mg/dL Final    GFR MALE 04/04/2023 >90  >90 mL/min/1.73m2 Final    Calcium 04/04/2023 10.6  8.6 - 10.6 mg/dL Final    Albumin 04/04/2023 4.5  3.4 - 5.0 g/dL Final    Alkaline Phosphatase 04/04/2023 72  33 - 136 U/L Final    Total Protein 04/04/2023 6.8  6.4 - 8.2 g/dL Final    AST 04/04/2023 20  9 - 39 U/L Final    Total Bilirubin 04/04/2023 0.9  0.0 - 1.2 mg/dL Final    ALT (SGPT) 04/04/2023 30  10 - 52 U/L Final    Cholesterol 04/04/2023 217 (H)  0 - 199 mg/dL Final    HDL 04/04/2023 40.7  mg/dL Final    Cholesterol/HDL Ratio 04/04/2023 5.3 (A)   Final    LDL 04/04/2023 -  0 - 99 mg/dL Final    VLDL 04/04/2023 SEE COMMENT  0 - 40 mg/dL Final    Triglycerides 04/04/2023 449  (H)  0 - 149 mg/dL Final    Non HDL Cholesterol 04/04/2023 176  mg/dL Final    TSH 04/04/2023 2.80  0.44 - 3.98 mIU/L Final    Vitamin D, 25-Hydroxy 04/04/2023 42  ng/mL Final    Prostate Specific Antigen,Screen 04/04/2023 1.71  0.00 - 4.00 ng/mL Final    Hemoglobin A1C 04/04/2023 5.4  % Final    Estimated Average Glucose 04/04/2023 108  MG/DL Final       Radiology: Reviewed imaging in powerchart.  XR humerus left    Result Date: 7/10/2023  XR HUMERUS LEFT INC 1 JNT CLINICAL STATEMENT: LUMP;OTHER REASON. TECHNOLOGIST NOTES: WHAT SYMPTOMS ARE YOU EXPERIENCING? - LUMP ON LATERAL SIDE OF LEFT ELBOW  COMPARISON:  None FINDINGS:  2 views of the left humerus were obtained. No acute fracture or dislocation is identified.  There is no radiopaque foreign body.    The elbow and shoulder joint spaces are normally maintained. There are no osteoblastic or osteolytic lesion seen IMPRESSION: No acute fracture or dislocation. Normal humerus Electronically signed by:  Celnie Farrar MD  7/11/2023 8:42 AM CDT Workstation: [id number] Technologist:  LULY LERMA Dictated By:   CELINE FARRAR MD Signed By:     CELINE FARRAR MD Signed Out:    07/11/23 09:42:21        Charting was completed using voice recognition technology and may include unintended errors.

## 2023-09-08 ENCOUNTER — OFFICE VISIT (OUTPATIENT)
Dept: PRIMARY CARE | Facility: CLINIC | Age: 68
End: 2023-09-08
Payer: MEDICARE

## 2023-09-08 VITALS
SYSTOLIC BLOOD PRESSURE: 119 MMHG | OXYGEN SATURATION: 98 % | BODY MASS INDEX: 23.4 KG/M2 | HEIGHT: 69 IN | TEMPERATURE: 97.7 F | WEIGHT: 158 LBS | DIASTOLIC BLOOD PRESSURE: 78 MMHG | HEART RATE: 92 BPM

## 2023-09-08 DIAGNOSIS — J06.9 UPPER RESPIRATORY TRACT INFECTION, UNSPECIFIED TYPE: Primary | ICD-10-CM

## 2023-09-08 DIAGNOSIS — E78.2 HYPERLIPEMIA, MIXED: ICD-10-CM

## 2023-09-08 DIAGNOSIS — K22.710 BARRETT'S ESOPHAGUS WITH LOW GRADE DYSPLASIA: ICD-10-CM

## 2023-09-08 DIAGNOSIS — R25.1 TREMOR: ICD-10-CM

## 2023-09-08 PROBLEM — K43.9 VENTRAL HERNIA: Status: RESOLVED | Noted: 2023-08-04 | Resolved: 2023-09-08

## 2023-09-08 PROBLEM — R73.01 IMPAIRED FASTING GLUCOSE: Status: RESOLVED | Noted: 2023-02-15 | Resolved: 2023-09-08

## 2023-09-08 PROBLEM — M19.90 ARTHRITIS: Status: RESOLVED | Noted: 2023-02-15 | Resolved: 2023-09-08

## 2023-09-08 PROBLEM — E78.5 HYPERLIPIDEMIA: Status: RESOLVED | Noted: 2023-02-15 | Resolved: 2023-09-08

## 2023-09-08 PROBLEM — M77.12 EPICONDYLITIS, LATERAL, LEFT: Status: RESOLVED | Noted: 2023-02-15 | Resolved: 2023-09-08

## 2023-09-08 PROBLEM — R73.9 HYPERGLYCEMIA: Status: RESOLVED | Noted: 2023-06-27 | Resolved: 2023-09-08

## 2023-09-08 LAB
NON-UH HIE A/G RATIO: 1.4
NON-UH HIE ALB: 4.2 G/DL (ref 3.4–5)
NON-UH HIE ALK PHOS: 90 UNIT/L (ref 46–116)
NON-UH HIE BASO COUNT: 0.05 X1000 (ref 0–0.2)
NON-UH HIE BASOS %: 0.6 %
NON-UH HIE BILIRUBIN, TOTAL: 1.8 MG/DL (ref 0.2–1)
NON-UH HIE BUN/CREAT RATIO: 12.2
NON-UH HIE BUN: 11 MG/DL (ref 9–23)
NON-UH HIE C-REACTIVE PROTEIN, QUANTITATIVE: 9.7 MG/DL (ref 0–0.9)
NON-UH HIE CALCIUM: 9.8 MG/DL (ref 8.7–10.4)
NON-UH HIE CALCULATED LDL CHOLESTEROL: 72 MG/DL (ref 60–130)
NON-UH HIE CALCULATED OSMOLALITY: 279 MOSM/KG (ref 275–295)
NON-UH HIE CHLORIDE: 101 MMOL/L (ref 98–107)
NON-UH HIE CHOLESTEROL: 166 MG/DL (ref 100–200)
NON-UH HIE CO2, VENOUS: 29 MMOL/L (ref 20–31)
NON-UH HIE COVID-19 BY PCR IP: POSITIVE
NON-UH HIE CREATININE: 0.9 MG/DL (ref 0.6–1.1)
NON-UH HIE DIFF?: NO
NON-UH HIE EOS COUNT: 0.02 X1000 (ref 0–0.5)
NON-UH HIE EOSIN %: 0.2 %
NON-UH HIE GFR AA: >60
NON-UH HIE GLOBULIN: 3 G/DL
NON-UH HIE GLOMERULAR FILTRATION RATE: >60 ML/MIN/1.73M?
NON-UH HIE GLUCOSE: 105 MG/DL (ref 74–106)
NON-UH HIE GOT: 25 UNIT/L (ref 15–37)
NON-UH HIE GPT: 43 UNIT/L (ref 10–49)
NON-UH HIE HCT: 46.8 % (ref 41–52)
NON-UH HIE HDL CHOLESTEROL: 55 MG/DL (ref 40–60)
NON-UH HIE HGB A1C: 5.5 %
NON-UH HIE HGB: 16.1 G/DL (ref 13.5–17.5)
NON-UH HIE INSTR WBC: 7.9
NON-UH HIE K: 3.9 MMOL/L (ref 3.5–5.1)
NON-UH HIE LYMPH %: 21.7 %
NON-UH HIE LYMPH COUNT: 1.72 X1000 (ref 1.2–4.8)
NON-UH HIE MCH: 31.4 PG (ref 27–34)
NON-UH HIE MCHC: 34.4 G/DL (ref 32–37)
NON-UH HIE MCV: 91.2 FL (ref 80–100)
NON-UH HIE MONO %: 9.5 %
NON-UH HIE MONO COUNT: 0.75 X1000 (ref 0.1–1)
NON-UH HIE MPV: 8 FL (ref 7.4–10.4)
NON-UH HIE NA: 140 MMOL/L (ref 135–145)
NON-UH HIE NEUTROPHIL %: 68 %
NON-UH HIE NEUTROPHIL COUNT (ANC): 5.38 X1000 (ref 1.4–8.8)
NON-UH HIE NUCLEATED RBC: 0 /100WBC
NON-UH HIE PLATELET: 209 X10 (ref 150–450)
NON-UH HIE RAPID INFLUENZA A ANTIGEN TEST: NEGATIVE
NON-UH HIE RAPID INFLUENZA B ANTIGEN TEST: NEGATIVE
NON-UH HIE RBC: 5.13 X10 (ref 4.7–6.1)
NON-UH HIE RDW: 14.6 % (ref 11.5–14.5)
NON-UH HIE RFAB INT QC: PRESENT
NON-UH HIE SED RATE WESTERGREN: 19 MM/HR (ref 0–20)
NON-UH HIE TOTAL CHOL/HDL CHOL RATIO: 3
NON-UH HIE TOTAL PROTEIN: 7.2 G/DL (ref 5.7–8.2)
NON-UH HIE TRIGLYCERIDES: 193 MG/DL (ref 30–150)
NON-UH HIE URIC ACID: 5.8 (ref 3.7–9.2)
NON-UH HIE WBC: 7.9 X10 (ref 4.5–11)

## 2023-09-08 PROCEDURE — 1159F MED LIST DOCD IN RCRD: CPT | Performed by: INTERNAL MEDICINE

## 2023-09-08 PROCEDURE — 99213 OFFICE O/P EST LOW 20 MIN: CPT | Performed by: INTERNAL MEDICINE

## 2023-09-08 PROCEDURE — 1160F RVW MEDS BY RX/DR IN RCRD: CPT | Performed by: INTERNAL MEDICINE

## 2023-09-08 PROCEDURE — 1157F ADVNC CARE PLAN IN RCRD: CPT | Performed by: INTERNAL MEDICINE

## 2023-09-08 PROCEDURE — 1036F TOBACCO NON-USER: CPT | Performed by: INTERNAL MEDICINE

## 2023-09-08 RX ORDER — METHYLPREDNISOLONE 4 MG/1
TABLET ORAL
Qty: 21 TABLET | Refills: 0 | Status: SHIPPED | OUTPATIENT
Start: 2023-09-08 | End: 2023-09-15

## 2023-09-08 RX ORDER — AZITHROMYCIN 250 MG/1
TABLET, FILM COATED ORAL
Qty: 6 TABLET | Refills: 0 | Status: SHIPPED | OUTPATIENT
Start: 2023-09-08 | End: 2023-09-13

## 2023-09-08 NOTE — PROGRESS NOTES
Subjective   Patient ID: Gerald Castro is a 67 y.o. male who presents for Fever (Since Wednesday ).    Assessment/Plan     Problem List Items Addressed This Visit    None  Visit Diagnoses       Upper respiratory tract infection, unspecified type              Patient was evaluated today, problem list was reviewed, problems and concerns addressed, Rx list reviewed and updated, lab and tests were noted and reviewed. Life style changes were discussed, always it works better if we eat plant based diet and plenty of fibres and roughage. Consume adequate amount of water and avoid alcohol, light to moderate physical activities and stress reduction are always beneficial for ongoing physical well being. Do not forget to have 6 to 7 hours of sleep regularly and avoid late night dee screen exposure.    HPI  This is a 67-year-old patient with hyperlipidemia gastritis complaining of the sore throat cough congestion headache onset acutely duration 3 days progressed acutely aggravating factor immunocompromise host    Negative for chest pain headache hematuria rectal bleeding    Negative for gout    Aggravating factor weather change and patient very about a wife who had advanced health disease very body flu COVID-pneumonia  Past Medical History:   Diagnosis Date    Anesthesia of skin 02/18/2020    Numbness and tingling    Cellulitis of left finger 01/17/2022    Paronychia of left thumb    Disorder of thyroid, unspecified 06/01/2020    Thyroid mass    Encounter for immunization     Encounter for immunization    Encounter for other preprocedural examination 11/21/2019    Encounter for preadmission testing    Encounter for screening for cardiovascular disorders 05/13/2019    Encounter for abdominal aortic aneurysm (AAA) screening    Essential (primary) hypertension 07/23/2021    Benign essential hypertension    Generalized anxiety disorder 07/23/2021    ALVERTO (generalized anxiety disorder)    Local infection of the skin and  subcutaneous tissue, unspecified 01/17/2022    Infection of thumb    Other chest pain 11/13/2018    Atypical chest pain    Personal history of other diseases of the digestive system 07/23/2021    History of colitis    Personal history of other diseases of the digestive system 12/17/2019    History of bilateral inguinal hernias    Personal history of other diseases of the digestive system 02/18/2020    History of diverticulitis of colon    Personal history of other diseases of the digestive system 01/17/2022    History of gastroesophageal reflux (GERD)    Personal history of other diseases of the musculoskeletal system and connective tissue 11/16/2018    History of muscle spasm    Personal history of other diseases of the respiratory system 02/18/2020    History of upper respiratory infection    Personal history of other diseases of the respiratory system     History of influenza    Personal history of other endocrine, nutritional and metabolic disease 02/18/2021    History of vitamin D deficiency    Personal history of other infectious and parasitic diseases 04/21/2020    History of herpes zoster    Personal history of other mental and behavioral disorders 01/17/2022    History of anxiety disorder    Personal history of other specified conditions 08/03/2020    History of weight gain    Personal history of other specified conditions 05/15/2019    History of abnormal weight loss    Personal history of urinary calculi 02/18/2020    History of renal calculi    Sciatica, right side 09/10/2015    Right sided sciatica     Past Surgical History:   Procedure Laterality Date    OTHER SURGICAL HISTORY  11/04/2019    Colonoscopy    OTHER SURGICAL HISTORY  11/04/2019    Esophagogastroduodenoscopy    OTHER SURGICAL HISTORY  12/01/2022    Thyroid surgery    OTHER SURGICAL HISTORY  12/17/2019    Inguinal hernia repair     No Known Allergies  Current Outpatient Medications   Medication Sig Dispense Refill    atorvastatin (Lipitor)  80 mg tablet Take 1 tablet (80 mg) by mouth once daily.      cholecalciferol (Vitamin D-3) 25 MCG (1000 UT) tablet Take 2 tablets (50 mcg) by mouth once daily.      indomethacin (Indocin) 50 mg capsule ONE A DAY 10 capsule 0    multivit with minerals/lutein (MULTIVITAMIN 50 PLUS ORAL) 1 tabs, ORAL, DAILY, in the afternoon, # 100 tabs, Refill(s) 0, Date: 2020 12:31:00 EDT      omega-3 1,000 mg capsule capsule 4 capsules (4,000 mg).      omeprazole (PriLOSEC) 40 mg DR capsule Take 1 capsule (40 mg) by mouth 4 times a week. 90 capsule 3     No current facility-administered medications for this visit.     Family History   Problem Relation Name Age of Onset    Heart disease Mother      Hypertension Mother      Other (agent orange) Father       Social History     Socioeconomic History    Marital status: Single     Spouse name: None    Number of children: None    Years of education: None    Highest education level: None   Occupational History    None   Tobacco Use    Smoking status: Former     Types: Cigarettes     Quit date: 3/4/2019     Years since quittin.5    Smokeless tobacco: Never   Substance and Sexual Activity    Alcohol use: Never    Drug use: Defer    Sexual activity: None   Other Topics Concern    None   Social History Narrative    None     Social Determinants of Health     Financial Resource Strain: Not on file   Food Insecurity: Not on file   Transportation Needs: Not on file   Physical Activity: Not on file   Stress: Not on file   Social Connections: Not on file   Intimate Partner Violence: Not on file   Housing Stability: Not on file     Immunization History   Administered Date(s) Administered    Flu vaccine (IIV4), preservative free *Check age/dose* 2018, 2019    Flu vaccine, quadrivalent, high-dose, preservative free, age 65y+ (FLUZONE) 2021, 2022    Influenza, High Dose Seasonal, Preservative Free 10/13/2016, 10/17/2016    Influenza, Injectable, MDCK, preservative free  "10/17/2017    Influenza, seasonal, injectable 09/22/2013, 09/09/2014, 09/04/2022    Influenza, seasonal, injectable, preservative free 10/14/2008, 09/08/2015, 08/30/2016, 09/01/2020    Moderna COVID-19 vaccine, bivalent, blue cap/gray label *Check age/dose* 09/11/2022    Moderna SARS-CoV-2 Vaccination 01/12/2021, 02/10/2021, 02/12/2021, 03/12/2021, 11/02/2021, 04/07/2022, 09/11/2022    Pneumococcal conjugate vaccine, 20-valent, adult (PREVNAR 20) 04/04/2023    Pneumococcal polysaccharide vaccine, 23-valent, age 2 years and older (PNEUMOVAX 23) 02/18/2020       Review of Systems  Review of systems is otherwise negative unless stated above or in history of present illness.    Objective   Visit Vitals  /78 (BP Location: Left arm, Patient Position: Sitting, BP Cuff Size: Adult)   Pulse 92   Temp 36.5 °C (97.7 °F)   Ht 1.753 m (5' 9\")   Wt 71.7 kg (158 lb)   SpO2 98%   BMI 23.33 kg/m²   Smoking Status Former   BSA 1.87 m²     Physical Exam  Constitutional: Anxiety   General: not in acute distress.   HENT: Sore throat     Head: Normocephalic and atraumatic.      Nose: Nose normal.   Eyes:      Extraocular Movements: Extraocular movements intact.      Conjunctiva/sclera: Conjunctivae normal.   Cardiovascular: Heart murmur         Rate and Rhythm: Normal rate ,  No M/R/G  Pulmonary: Crackles     Effort: Pulmonary effort is normal.      Breath sounds: Normal, Bilat Equal AE  Skin:     General: Skin is warm.   Neurological:      Mental Status: He is alert and oriented to person, place, and time.   Psychiatric:         Mood and Affect: Mood normal.         Behavior: Behavior normal.   Musculoskeletal   FROM in all extremitirs,  Joint-no swelling or tenderness    No visits with results within 4 Month(s) from this visit.   Latest known visit with results is:   Office Visit on 04/04/2023   Component Date Value Ref Range Status    WBC 04/04/2023 9.0  4.4 - 11.3 x10E9/L Final    nRBC 04/04/2023 0.0  0.0 - 0.0 /100 WBC Final "    RBC 04/04/2023 4.57  4.50 - 5.90 x10E12/L Final    Hemoglobin 04/04/2023 14.3  13.5 - 17.5 g/dL Final    Hematocrit 04/04/2023 43.5  41.0 - 52.0 % Final    MCV 04/04/2023 95  80 - 100 fL Final    MCHC 04/04/2023 32.9  32.0 - 36.0 g/dL Final    Platelets 04/04/2023 231  150 - 450 x10E9/L Final    RDW 04/04/2023 14.2  11.5 - 14.5 % Final    Glucose 04/04/2023 96  74 - 99 mg/dL Final    Sodium 04/04/2023 141  136 - 145 mmol/L Final    Potassium 04/04/2023 4.3  3.5 - 5.3 mmol/L Final    Chloride 04/04/2023 100  98 - 107 mmol/L Final    Bicarbonate 04/04/2023 27  21 - 32 mmol/L Final    Anion Gap 04/04/2023 18  10 - 20 mmol/L Final    Urea Nitrogen 04/04/2023 15  6 - 23 mg/dL Final    Creatinine 04/04/2023 0.82  0.50 - 1.30 mg/dL Final    GFR MALE 04/04/2023 >90  >90 mL/min/1.73m2 Final    Calcium 04/04/2023 10.6  8.6 - 10.6 mg/dL Final    Albumin 04/04/2023 4.5  3.4 - 5.0 g/dL Final    Alkaline Phosphatase 04/04/2023 72  33 - 136 U/L Final    Total Protein 04/04/2023 6.8  6.4 - 8.2 g/dL Final    AST 04/04/2023 20  9 - 39 U/L Final    Total Bilirubin 04/04/2023 0.9  0.0 - 1.2 mg/dL Final    ALT (SGPT) 04/04/2023 30  10 - 52 U/L Final    Cholesterol 04/04/2023 217 (H)  0 - 199 mg/dL Final    HDL 04/04/2023 40.7  mg/dL Final    Cholesterol/HDL Ratio 04/04/2023 5.3 (A)   Final    LDL 04/04/2023 -  0 - 99 mg/dL Final    VLDL 04/04/2023 SEE COMMENT  0 - 40 mg/dL Final    Triglycerides 04/04/2023 449 (H)  0 - 149 mg/dL Final    Non HDL Cholesterol 04/04/2023 176  mg/dL Final    TSH 04/04/2023 2.80  0.44 - 3.98 mIU/L Final    Vitamin D, 25-Hydroxy 04/04/2023 42  ng/mL Final    Prostate Specific Antigen,Screen 04/04/2023 1.71  0.00 - 4.00 ng/mL Final    Hemoglobin A1C 04/04/2023 5.4  % Final    Estimated Average Glucose 04/04/2023 108  MG/DL Final       Radiology: Reviewed imaging in powerchart.  No results found.      Charting was completed using voice recognition technology and may include unintended errors.

## 2023-09-11 ENCOUNTER — TELEPHONE (OUTPATIENT)
Dept: PRIMARY CARE | Facility: CLINIC | Age: 68
End: 2023-09-11
Payer: MEDICARE

## 2023-09-11 LAB
NON-UH HIE ANTI-DNA QUANT: NORMAL
NON-UH HIE ANTI-DNA: POSITIVE
NON-UH HIE ANTI-NUCLEAR ANTIBODY: POSITIVE

## 2023-09-12 LAB
NON-UH HIE ANA PATTERN: NORMAL
NON-UH HIE ANTI-NUCLEAR AB QUANT: NORMAL
NON-UH HIE RHEUMATOID FACTOR: NEGATIVE

## 2024-02-01 ENCOUNTER — TELEPHONE (OUTPATIENT)
Dept: PRIMARY CARE | Facility: CLINIC | Age: 69
End: 2024-02-01
Payer: MEDICARE

## 2024-02-01 DIAGNOSIS — E78.2 HYPERLIPEMIA, MIXED: ICD-10-CM

## 2024-02-01 RX ORDER — ATORVASTATIN CALCIUM 80 MG/1
80 TABLET, FILM COATED ORAL DAILY
Qty: 90 TABLET | Refills: 3 | Status: SHIPPED | OUTPATIENT
Start: 2024-02-01

## 2024-04-04 ENCOUNTER — OFFICE VISIT (OUTPATIENT)
Dept: PRIMARY CARE | Facility: CLINIC | Age: 69
End: 2024-04-04
Payer: MEDICARE

## 2024-04-04 ENCOUNTER — TELEPHONE (OUTPATIENT)
Dept: PRIMARY CARE | Facility: CLINIC | Age: 69
End: 2024-04-04

## 2024-04-04 VITALS
BODY MASS INDEX: 23.99 KG/M2 | DIASTOLIC BLOOD PRESSURE: 70 MMHG | OXYGEN SATURATION: 99 % | HEIGHT: 69 IN | SYSTOLIC BLOOD PRESSURE: 118 MMHG | TEMPERATURE: 97 F | WEIGHT: 162 LBS | HEART RATE: 46 BPM

## 2024-04-04 DIAGNOSIS — N40.0 BENIGN PROSTATIC HYPERPLASIA WITHOUT LOWER URINARY TRACT SYMPTOMS: ICD-10-CM

## 2024-04-04 DIAGNOSIS — K22.719 BARRETT'S ESOPHAGUS WITH DYSPLASIA: ICD-10-CM

## 2024-04-04 DIAGNOSIS — R25.1 TREMOR: ICD-10-CM

## 2024-04-04 DIAGNOSIS — Z11.59 NEED FOR HEPATITIS C SCREENING TEST: ICD-10-CM

## 2024-04-04 DIAGNOSIS — E78.2 HYPERLIPEMIA, MIXED: ICD-10-CM

## 2024-04-04 DIAGNOSIS — R00.1 BRADYCARDIA: ICD-10-CM

## 2024-04-04 DIAGNOSIS — E78.2 MIXED HYPERLIPIDEMIA: ICD-10-CM

## 2024-04-04 DIAGNOSIS — Z00.00 MEDICARE ANNUAL WELLNESS VISIT, SUBSEQUENT: Primary | ICD-10-CM

## 2024-04-04 DIAGNOSIS — K21.9 GERD WITHOUT ESOPHAGITIS: ICD-10-CM

## 2024-04-04 DIAGNOSIS — Z12.11 COLON CANCER SCREENING: ICD-10-CM

## 2024-04-04 PROBLEM — J06.9 UPPER RESPIRATORY TRACT INFECTION: Status: RESOLVED | Noted: 2023-09-08 | Resolved: 2024-04-04

## 2024-04-04 LAB
NON-UH HIE CALCULATED LDL CHOLESTEROL: 72 MG/DL (ref 60–130)
NON-UH HIE CHOLESTEROL: 164 MG/DL (ref 100–200)
NON-UH HIE CPK: 136 UNIT/L (ref 46–171)
NON-UH HIE HDL CHOLESTEROL: 45 MG/DL (ref 40–60)
NON-UH HIE HEPATITIS C ANTIBODY: NONREACTIVE
NON-UH HIE MAGNESIUM: 2 MG/DL (ref 1.6–2.6)
NON-UH HIE PROSTATIC SPECIFIC ANTIGEN: 1.5 NG/ML (ref 0–4)
NON-UH HIE TOTAL CHOL/HDL CHOL RATIO: 3.6
NON-UH HIE TRIGLYCERIDES: 236 MG/DL (ref 30–150)
NON-UH HIE TSH: 3.59 UIU/ML (ref 0.55–4.78)

## 2024-04-04 PROCEDURE — 1170F FXNL STATUS ASSESSED: CPT | Performed by: INTERNAL MEDICINE

## 2024-04-04 PROCEDURE — 99497 ADVNCD CARE PLAN 30 MIN: CPT | Performed by: INTERNAL MEDICINE

## 2024-04-04 PROCEDURE — 1160F RVW MEDS BY RX/DR IN RCRD: CPT | Performed by: INTERNAL MEDICINE

## 2024-04-04 PROCEDURE — 1157F ADVNC CARE PLAN IN RCRD: CPT | Performed by: INTERNAL MEDICINE

## 2024-04-04 PROCEDURE — 99213 OFFICE O/P EST LOW 20 MIN: CPT | Performed by: INTERNAL MEDICINE

## 2024-04-04 PROCEDURE — 1036F TOBACCO NON-USER: CPT | Performed by: INTERNAL MEDICINE

## 2024-04-04 PROCEDURE — 1159F MED LIST DOCD IN RCRD: CPT | Performed by: INTERNAL MEDICINE

## 2024-04-04 PROCEDURE — G0439 PPPS, SUBSEQ VISIT: HCPCS | Performed by: INTERNAL MEDICINE

## 2024-04-04 ASSESSMENT — ACTIVITIES OF DAILY LIVING (ADL)
DOING_HOUSEWORK: INDEPENDENT
DRESSING: INDEPENDENT
BATHING: INDEPENDENT
MANAGING_FINANCES: INDEPENDENT
GROCERY_SHOPPING: INDEPENDENT
TAKING_MEDICATION: INDEPENDENT

## 2024-04-04 ASSESSMENT — PATIENT HEALTH QUESTIONNAIRE - PHQ9
1. LITTLE INTEREST OR PLEASURE IN DOING THINGS: NOT AT ALL
SUM OF ALL RESPONSES TO PHQ9 QUESTIONS 1 AND 2: 0
2. FEELING DOWN, DEPRESSED OR HOPELESS: NOT AT ALL
2. FEELING DOWN, DEPRESSED OR HOPELESS: NOT AT ALL
1. LITTLE INTEREST OR PLEASURE IN DOING THINGS: NOT AT ALL
SUM OF ALL RESPONSES TO PHQ9 QUESTIONS 1 AND 2: 0

## 2024-04-04 ASSESSMENT — LIFESTYLE VARIABLES
HOW MANY STANDARD DRINKS CONTAINING ALCOHOL DO YOU HAVE ON A TYPICAL DAY: PATIENT DOES NOT DRINK
HOW OFTEN DO YOU HAVE A DRINK CONTAINING ALCOHOL: NEVER
HOW OFTEN DO YOU HAVE SIX OR MORE DRINKS ON ONE OCCASION: NEVER
AUDIT-C TOTAL SCORE: 0
SKIP TO QUESTIONS 9-10: 1

## 2024-04-09 ENCOUNTER — TELEPHONE (OUTPATIENT)
Dept: GASTROENTEROLOGY | Facility: CLINIC | Age: 69
End: 2024-04-09
Payer: MEDICARE

## 2024-05-07 ENCOUNTER — OFFICE VISIT (OUTPATIENT)
Dept: SURGERY | Facility: CLINIC | Age: 69
End: 2024-05-07
Payer: MEDICARE

## 2024-05-07 DIAGNOSIS — M62.08 DIASTASIS OF RECTUS ABDOMINIS: Primary | ICD-10-CM

## 2024-05-07 PROCEDURE — 1036F TOBACCO NON-USER: CPT | Performed by: SURGERY

## 2024-05-07 PROCEDURE — 1157F ADVNC CARE PLAN IN RCRD: CPT | Performed by: SURGERY

## 2024-05-07 PROCEDURE — 99213 OFFICE O/P EST LOW 20 MIN: CPT | Performed by: SURGERY

## 2024-05-07 PROCEDURE — 1160F RVW MEDS BY RX/DR IN RCRD: CPT | Performed by: SURGERY

## 2024-05-07 PROCEDURE — 1159F MED LIST DOCD IN RCRD: CPT | Performed by: SURGERY

## 2024-05-07 NOTE — PROGRESS NOTES
Subjective   Patient ID: Gerald Castro is a 68 y.o. male who presents for No chief complaint on file..  Patient complains of a large lump in epigastric area.  The patient was seen last year with rectus diastases.  No surgical intervention was recommended.  Patient feels more pain over the last few months    HPI history of bilateral inguinal hernia repair, done robotically.  Patient develop muscle rectus diastases over the years.  There is a worsening of the last couple months.  Review of Systems integumentary consistent with epigastric discomfort.  Review of other 10 system is negative  Physical Exam pupils equal bilaterally, oral mucosa moist, bilateral breath sounds, regular rhythm and rate, abdomen soft, mild tenderness in epigastric area.  Palpable peripheral pulses, no focal neurological motor deficits.  There is a muscle rectus diastases without evidence of any complications.    Objective   I reviewed all available data including lab results, radiological studies, previous reports and notes.  No diagnosis found.   Patient Active Problem List   Diagnosis    Bartholomew's esophagus    Mixed hyperlipidemia    Tremor    Need for hepatitis C screening test    Medicare annual wellness visit, subsequent    GERD without esophagitis    Benign prostatic hyperplasia without lower urinary tract symptoms    Bradycardia      No Known Allergies   Medication Documentation Review Audit       Reviewed by Alfie Herron MD (Physician) on 05/07/24 at 1108      Medication Order Taking? Sig Documenting Provider Last Dose Status   atorvastatin (Lipitor) 80 mg tablet [id number] No Take 1 tablet (80 mg) by mouth once daily. Haseeb Hayes MD Taking Active   cholecalciferol (Vitamin D-3) 25 MCG (1000 UT) tablet [id number] No Take 2 tablets (50 mcg) by mouth once daily. Historical Provider, MD Taking Active   multivit with minerals/lutein (MULTIVITAMIN 50 PLUS ORAL) [id number] No 1 tabs, ORAL, DAILY, in the afternoon, # 100 tabs,  Refill(s) 0, Date: 06/03/2020 12:31:00 EDT Historical Provider, MD Taking Active   omeprazole (PriLOSEC) 40 mg DR capsule [id number] No Take 1 capsule (40 mg) by mouth 4 times a week. Gabriel Pace MD Taking Active                    Past Medical History:   Diagnosis Date    Anesthesia of skin 02/18/2020    Numbness and tingling    Cellulitis of left finger 01/17/2022    Paronychia of left thumb    Disorder of thyroid, unspecified 06/01/2020    Thyroid mass    Encounter for immunization     Encounter for immunization    Encounter for other preprocedural examination 11/21/2019    Encounter for preadmission testing    Encounter for screening for cardiovascular disorders 05/13/2019    Encounter for abdominal aortic aneurysm (AAA) screening    Essential (primary) hypertension 07/23/2021    Benign essential hypertension    Generalized anxiety disorder 07/23/2021    AVLERTO (generalized anxiety disorder)    Local infection of the skin and subcutaneous tissue, unspecified 01/17/2022    Infection of thumb    Other chest pain 11/13/2018    Atypical chest pain    Personal history of other diseases of the digestive system 07/23/2021    History of colitis    Personal history of other diseases of the digestive system 12/17/2019    History of bilateral inguinal hernias    Personal history of other diseases of the digestive system 02/18/2020    History of diverticulitis of colon    Personal history of other diseases of the digestive system 01/17/2022    History of gastroesophageal reflux (GERD)    Personal history of other diseases of the musculoskeletal system and connective tissue 11/16/2018    History of muscle spasm    Personal history of other diseases of the respiratory system 02/18/2020    History of upper respiratory infection    Personal history of other diseases of the respiratory system     History of influenza    Personal history of other endocrine, nutritional and metabolic disease 02/18/2021    History of vitamin D  deficiency    Personal history of other infectious and parasitic diseases 2020    History of herpes zoster    Personal history of other mental and behavioral disorders 2022    History of anxiety disorder    Personal history of other specified conditions 2020    History of weight gain    Personal history of other specified conditions 05/15/2019    History of abnormal weight loss    Personal history of urinary calculi 2020    History of renal calculi    Sciatica, right side 09/10/2015    Right sided sciatica     Social History     Tobacco Use   Smoking Status Former    Current packs/day: 0.00    Types: Cigarettes    Quit date: 3/4/2019    Years since quittin.1   Smokeless Tobacco Never     Family History   Problem Relation Name Age of Onset    Heart disease Mother      Hypertension Mother      Other (agent orange) Father        Past Surgical History:   Procedure Laterality Date    OTHER SURGICAL HISTORY  2019    Colonoscopy    OTHER SURGICAL HISTORY  2019    Esophagogastroduodenoscopy    OTHER SURGICAL HISTORY  2022    Thyroid surgery    OTHER SURGICAL HISTORY  2019    Inguinal hernia repair       Assessment/Plan   Patient was diastases recti.  It does not affect patient's functioning.  There is slight increase of discomfort.  I discussed with the patient all options, included operative management.  I do not recommend operative management because it will not bring significant improvement to patient's lifestyle.  I recommend to use abdominal binder for heavy physical activity.  Follow-up as needed      Alfie Herron MD

## 2024-05-10 ENCOUNTER — APPOINTMENT (OUTPATIENT)
Dept: SURGERY | Facility: CLINIC | Age: 69
End: 2024-05-10
Payer: MEDICARE

## 2024-06-15 DIAGNOSIS — K22.70 BARRETT'S ESOPHAGUS WITHOUT DYSPLASIA: ICD-10-CM

## 2024-06-17 RX ORDER — OMEPRAZOLE 40 MG/1
CAPSULE, DELAYED RELEASE ORAL
Qty: 58 CAPSULE | Refills: 2 | Status: SHIPPED | OUTPATIENT
Start: 2024-06-17

## 2024-07-09 ENCOUNTER — APPOINTMENT (OUTPATIENT)
Dept: PRIMARY CARE | Facility: CLINIC | Age: 69
End: 2024-07-09
Payer: MEDICARE

## 2024-07-11 ENCOUNTER — TELEPHONE (OUTPATIENT)
Dept: PRIMARY CARE | Facility: CLINIC | Age: 69
End: 2024-07-11
Payer: MEDICARE

## 2024-07-11 NOTE — TELEPHONE ENCOUNTER
PT called and is sitting downstairs in the X-Ray.  He is requesting that Dr send down an X-Ray for his right foot.

## 2024-07-12 NOTE — TELEPHONE ENCOUNTER
----- Message from Fela HOLLINS sent at 7/12/2024  3:04 PM EDT -----    ----- Message -----  From: Gabriel Pace MD  Sent: 7/12/2024   8:07 AM EDT  To: Fela Hernández MA    No acute bony abnormality.  X-ray normal if you still have pain coming office for cortisone injection

## 2024-07-12 NOTE — TELEPHONE ENCOUNTER
----- Message from Fela HOLLINS sent at 7/12/2024  2:57 PM EDT -----    ----- Message -----  From: Gabriel Pace MD  Sent: 7/12/2024   8:07 AM EDT  To: Fela Hernández MA    No acute bony abnormality.  X-ray normal if you still have pain coming office for cortisone injection

## 2024-11-01 DIAGNOSIS — E78.2 HYPERLIPEMIA, MIXED: ICD-10-CM

## 2024-11-01 RX ORDER — ATORVASTATIN CALCIUM 80 MG/1
80 TABLET, FILM COATED ORAL DAILY
Qty: 90 TABLET | Refills: 3 | Status: SHIPPED | OUTPATIENT
Start: 2024-11-01

## 2024-12-03 ENCOUNTER — TELEPHONE (OUTPATIENT)
Dept: PRIMARY CARE | Facility: CLINIC | Age: 69
End: 2024-12-03

## 2024-12-03 ENCOUNTER — TELEMEDICINE (OUTPATIENT)
Dept: PRIMARY CARE | Facility: CLINIC | Age: 69
End: 2024-12-03
Payer: MEDICARE

## 2024-12-03 VITALS — HEIGHT: 69 IN | WEIGHT: 160 LBS | BODY MASS INDEX: 23.7 KG/M2

## 2024-12-03 DIAGNOSIS — M54.42 ACUTE BILATERAL LOW BACK PAIN WITH BILATERAL SCIATICA: Primary | ICD-10-CM

## 2024-12-03 DIAGNOSIS — M54.42 ACUTE BILATERAL LOW BACK PAIN WITH BILATERAL SCIATICA: ICD-10-CM

## 2024-12-03 DIAGNOSIS — M54.41 ACUTE BILATERAL LOW BACK PAIN WITH BILATERAL SCIATICA: Primary | ICD-10-CM

## 2024-12-03 DIAGNOSIS — K22.70 BARRETT'S ESOPHAGUS WITHOUT DYSPLASIA: ICD-10-CM

## 2024-12-03 DIAGNOSIS — G89.29 CHRONIC BILATERAL LOW BACK PAIN WITH BILATERAL SCIATICA: ICD-10-CM

## 2024-12-03 DIAGNOSIS — Z78.0 ENCOUNTER FOR OSTEOPOROSIS SCREENING IN ASYMPTOMATIC POSTMENOPAUSAL PATIENT: ICD-10-CM

## 2024-12-03 DIAGNOSIS — E78.2 MIXED HYPERLIPIDEMIA: ICD-10-CM

## 2024-12-03 DIAGNOSIS — M54.42 CHRONIC BILATERAL LOW BACK PAIN WITH BILATERAL SCIATICA: ICD-10-CM

## 2024-12-03 DIAGNOSIS — Z13.820 ENCOUNTER FOR OSTEOPOROSIS SCREENING IN ASYMPTOMATIC POSTMENOPAUSAL PATIENT: ICD-10-CM

## 2024-12-03 DIAGNOSIS — M54.41 ACUTE BILATERAL LOW BACK PAIN WITH BILATERAL SCIATICA: ICD-10-CM

## 2024-12-03 DIAGNOSIS — N40.0 BENIGN PROSTATIC HYPERPLASIA WITHOUT LOWER URINARY TRACT SYMPTOMS: ICD-10-CM

## 2024-12-03 DIAGNOSIS — M54.41 CHRONIC BILATERAL LOW BACK PAIN WITH BILATERAL SCIATICA: ICD-10-CM

## 2024-12-03 PROBLEM — Z11.59 NEED FOR HEPATITIS C SCREENING TEST: Status: RESOLVED | Noted: 2024-04-04 | Resolved: 2024-12-03

## 2024-12-03 PROBLEM — M54.50 ACUTE BILATERAL LOW BACK PAIN: Status: ACTIVE | Noted: 2024-12-03

## 2024-12-03 PROBLEM — K21.9 GERD WITHOUT ESOPHAGITIS: Status: RESOLVED | Noted: 2024-04-04 | Resolved: 2024-12-03

## 2024-12-03 PROCEDURE — 1157F ADVNC CARE PLAN IN RCRD: CPT | Performed by: INTERNAL MEDICINE

## 2024-12-03 PROCEDURE — G2211 COMPLEX E/M VISIT ADD ON: HCPCS | Performed by: INTERNAL MEDICINE

## 2024-12-03 PROCEDURE — 1160F RVW MEDS BY RX/DR IN RCRD: CPT | Performed by: INTERNAL MEDICINE

## 2024-12-03 PROCEDURE — 3008F BODY MASS INDEX DOCD: CPT | Performed by: INTERNAL MEDICINE

## 2024-12-03 PROCEDURE — 1036F TOBACCO NON-USER: CPT | Performed by: INTERNAL MEDICINE

## 2024-12-03 PROCEDURE — 1159F MED LIST DOCD IN RCRD: CPT | Performed by: INTERNAL MEDICINE

## 2024-12-03 PROCEDURE — 99213 OFFICE O/P EST LOW 20 MIN: CPT | Performed by: INTERNAL MEDICINE

## 2024-12-03 RX ORDER — METHYLPREDNISOLONE 4 MG/1
TABLET ORAL
Qty: 21 TABLET | Refills: 0 | Status: SHIPPED | OUTPATIENT
Start: 2024-12-03 | End: 2024-12-03 | Stop reason: SDUPTHER

## 2024-12-03 RX ORDER — METHYLPREDNISOLONE 4 MG/1
TABLET ORAL
Qty: 21 TABLET | Refills: 0 | Status: SHIPPED | OUTPATIENT
Start: 2024-12-03 | End: 2024-12-09

## 2024-12-03 ASSESSMENT — PATIENT HEALTH QUESTIONNAIRE - PHQ9
SUM OF ALL RESPONSES TO PHQ9 QUESTIONS 1 AND 2: 0
2. FEELING DOWN, DEPRESSED OR HOPELESS: NOT AT ALL
1. LITTLE INTEREST OR PLEASURE IN DOING THINGS: NOT AT ALL

## 2024-12-03 NOTE — TELEPHONE ENCOUNTER
----- Message from Gabriel Pace sent at 12/3/2024  4:28 PM EST -----  Arthritis of SI joint advised physical therapy Medrol Dosepak

## 2024-12-03 NOTE — ASSESSMENT & PLAN NOTE
X-ray of the lumbar spine and sacral spine check sed rate given Medrol Dosepak follow-up advised physical therapy physical therapy

## 2024-12-03 NOTE — PROGRESS NOTES
Subjective   Patient ID: Gerald Castro is a 69 y.o. male who presents for Tailbone Pain.    Assessment/Plan     Problem List Items Addressed This Visit       Bartholomew's esophagus     Prilosec 1 a day advise follow-up with Dr. Rahman for EGD colonoscopy         Mixed hyperlipidemia     Continue Lipitor 80 mg a day monitor CMP CPK lipid twice a year         Benign prostatic hyperplasia without lower urinary tract symptoms     Monitor PSA urinalysis once a year         Acute bilateral low back pain - Primary     X-ray of the lumbar spine and sacral spine check sed rate given Medrol Dosepak follow-up advised physical therapy        Patient was evaluated today, problem list was reviewed, problems and concerns addressed, Rx list reviewed and updated, lab and tests were noted and reviewed. Life style changes were discussed, always it works better if we eat plant based diet and plenty of fibres and roughage. Consume adequate amount of water and avoid alcohol, light to moderate physical activities and stress reduction are always beneficial for ongoing physical well being. Do not forget to have 6 to 7 hours of sleep regularly and avoid late night dee screen exposure.      HPI this is a 69-year-old patient have hyperlipidemia gastritis complaining of the lower back pain tailbone pain aggravated by sitting standing for a long time onset gradually duration few weeks to months progress slowly aggravating factor activity standing and sitting    Negative for weakness skin rash trauma    Negative for bladder wall involvement    Discussed with the patient sent for sed rate x-ray physical therapy try Medrol Dosepak take vitamin C vitamin D calcium supplement if no better go to the emergency room waiting for the therapy and x-ray report  Past Medical History:   Diagnosis Date    Anesthesia of skin 02/18/2020    Numbness and tingling    Cellulitis of left finger 01/17/2022    Paronychia of left thumb    Disorder of thyroid,  unspecified 06/01/2020    Thyroid mass    Encounter for immunization     Encounter for immunization    Encounter for other preprocedural examination 11/21/2019    Encounter for preadmission testing    Encounter for screening for cardiovascular disorders 05/13/2019    Encounter for abdominal aortic aneurysm (AAA) screening    Essential (primary) hypertension 07/23/2021    Benign essential hypertension    Generalized anxiety disorder 07/23/2021    ALVERTO (generalized anxiety disorder)    Local infection of the skin and subcutaneous tissue, unspecified 01/17/2022    Infection of thumb    Other chest pain 11/13/2018    Atypical chest pain    Personal history of other diseases of the digestive system 07/23/2021    History of colitis    Personal history of other diseases of the digestive system 12/17/2019    History of bilateral inguinal hernias    Personal history of other diseases of the digestive system 02/18/2020    History of diverticulitis of colon    Personal history of other diseases of the digestive system 01/17/2022    History of gastroesophageal reflux (GERD)    Personal history of other diseases of the musculoskeletal system and connective tissue 11/16/2018    History of muscle spasm    Personal history of other diseases of the respiratory system 02/18/2020    History of upper respiratory infection    Personal history of other diseases of the respiratory system     History of influenza    Personal history of other endocrine, nutritional and metabolic disease 02/18/2021    History of vitamin D deficiency    Personal history of other infectious and parasitic diseases 04/21/2020    History of herpes zoster    Personal history of other mental and behavioral disorders 01/17/2022    History of anxiety disorder    Personal history of other specified conditions 08/03/2020    History of weight gain    Personal history of other specified conditions 05/15/2019    History of abnormal weight loss    Personal history of  urinary calculi 2020    History of renal calculi    Sciatica, right side 09/10/2015    Right sided sciatica     Past Surgical History:   Procedure Laterality Date    OTHER SURGICAL HISTORY  2019    Colonoscopy    OTHER SURGICAL HISTORY  2019    Esophagogastroduodenoscopy    OTHER SURGICAL HISTORY  2022    Thyroid surgery    OTHER SURGICAL HISTORY  2019    Inguinal hernia repair     No Known Allergies  Current Outpatient Medications   Medication Sig Dispense Refill    atorvastatin (Lipitor) 80 mg tablet TAKE 1 TABLET BY MOUTH ONCE  DAILY 90 tablet 3    cholecalciferol (Vitamin D-3) 25 MCG (1000 UT) tablet Take 2 tablets (50 mcg) by mouth once daily.      multivit with minerals/lutein (MULTIVITAMIN 50 PLUS ORAL) 1 tabs, ORAL, DAILY, in the afternoon, # 100 tabs, Refill(s) 0, Date: 2020 12:31:00 EDT      omeprazole (PriLOSEC) 40 mg DR capsule TAKE 1 CAPSULE BY MOUTH 4 TIMES  WEEKLY 58 capsule 2     No current facility-administered medications for this visit.     Family History   Problem Relation Name Age of Onset    Heart disease Mother      Hypertension Mother      Other (agent orange) Father       Social History     Socioeconomic History    Marital status: Single   Tobacco Use    Smoking status: Former     Current packs/day: 0.00     Types: Cigarettes     Quit date: 3/4/2019     Years since quittin.7    Smokeless tobacco: Never   Substance and Sexual Activity    Alcohol use: Never    Drug use: Never     Immunization History   Administered Date(s) Administered    Flu vaccine (IIV4), preservative free *Check age/dose* 2018, 2019    Flu vaccine, quadrivalent, high-dose, preservative free, age 65y+ (FLUZONE) 2021, 2022    Flu vaccine, trivalent, preservative free, HIGH-DOSE, age 65y+ (Fluzone) 10/13/2016, 10/17/2016    Flu vaccine, trivalent, preservative free, age 6 months and greater (Fluarix/Fluzone/Flulaval) 10/14/2008, 2015, 2016, 2020  "   Flu vaccine, trivalent, preservative free, no egg protein, age 6 months or greater (Flucelvax) 10/17/2017    Influenza, Seasonal, Quadrivalent, Adjuvanted 09/25/2023    Influenza, Unspecified 09/04/2022    Influenza, seasonal, injectable 08/30/2011, 09/04/2012, 09/22/2013, 09/09/2014, 08/28/2017, 09/11/2018, 09/19/2019, 09/01/2020, 09/04/2022, 08/17/2024    Moderna COVID-19 vaccine, 12 years and older (50mcg/0.5mL)(Spikevax) 12/05/2023    Moderna COVID-19 vaccine, bivalent, blue cap/gray label *Check age/dose* 09/11/2022    Moderna SARS-CoV-2 Vaccination 01/12/2021, 02/10/2021, 02/12/2021, 03/12/2021, 11/02/2021, 04/07/2022, 09/11/2022    Pfizer Purple Cap SARS-CoV-2 09/11/2024    Pneumococcal conjugate vaccine, 20-valent (PREVNAR 20) 04/04/2023    Pneumococcal polysaccharide vaccine, 23-valent, age 2 years and older (PNEUMOVAX 23) 02/18/2020    RESPIRATORY SYNCYTIAL VIRUS (RSV), ELIGIBLE PREGNANT PTS, 0.5 ML (ABRYSVO) 10/23/2023       Review of Systems  Review of systems is otherwise negative unless stated above or in history of present illness.    Objective   Visit Vitals  Ht 1.753 m (5' 9\")   Wt 72.6 kg (160 lb)   BMI 23.63 kg/m²   Smoking Status Former   BSA 1.88 m²     Physical Exam  .Doxy  This visit was completed via video audio relation to  covid 19 pandemic all issues as below that discuss and address but no physical exam was performed if it was felt that patient should be evaluated in clinic and they have been advised to follow . Patient verbally consented to visit and spent  more than 50% discuss about patient's complaint of problem and plan        No visits with results within 4 Month(s) from this visit.   Latest known visit with results is:   Orders Only on 04/04/2024   Component Date Value Ref Range Status    [facility] Prostatic Specific Anti* 04/04/2024 1.5  0.0 - 4.0 ng/mL Final    [facility] TSH 04/04/2024 3.59  0.55 - 4.78 uIU/ml Final    [facility] Magnesium 04/04/2024 2.0  1.6 - 2.6 mg/dL " Final    [facility] CPK 04/04/2024 136  46 - 171 unit/L Final    [facility] HDL Cholesterol 04/04/2024 45  40 - 60 mg/dL Final    [facility] Total Chol/HDL Chol Rat* 04/04/2024 3.6   Final    [facility] Triglycerides 04/04/2024 236 (H)  30 - 150 mg/dL Final    [facility] Cholesterol 04/04/2024 164  100 - 200 mg/dL Final    [facility] Calculated LDL Choleste* 04/04/2024 72  60 - 130 mg/dL Final    [facility] Hepatitis C Antibody 04/04/2024 Nonreactive   Final       Radiology: Reviewed imaging in powerchart.  No results found.      Charting was completed using voice recognition technology and may include unintended errors.

## 2024-12-05 ENCOUNTER — TELEPHONE (OUTPATIENT)
Dept: PRIMARY CARE | Facility: CLINIC | Age: 69
End: 2024-12-05
Payer: MEDICARE

## 2024-12-05 NOTE — TELEPHONE ENCOUNTER
----- Message from Gabriel Pace sent at 12/5/2024  5:12 PM EST -----  Arthritis of spine advised physical therapy

## 2025-01-11 DIAGNOSIS — K22.70 BARRETT'S ESOPHAGUS WITHOUT DYSPLASIA: ICD-10-CM

## 2025-01-13 RX ORDER — OMEPRAZOLE 40 MG/1
CAPSULE, DELAYED RELEASE ORAL
Qty: 58 CAPSULE | Refills: 2 | Status: SHIPPED | OUTPATIENT
Start: 2025-01-13

## 2025-02-28 ENCOUNTER — TELEPHONE (OUTPATIENT)
Dept: PRIMARY CARE | Facility: CLINIC | Age: 70
End: 2025-02-28
Payer: MEDICARE

## 2025-02-28 DIAGNOSIS — E78.2 HYPERLIPEMIA, MIXED: ICD-10-CM

## 2025-02-28 RX ORDER — ATORVASTATIN CALCIUM 80 MG/1
80 TABLET, FILM COATED ORAL DAILY
Qty: 90 TABLET | Refills: 3 | Status: SHIPPED | OUTPATIENT
Start: 2025-02-28

## 2025-06-16 ENCOUNTER — TELEPHONE (OUTPATIENT)
Dept: PRIMARY CARE | Facility: CLINIC | Age: 70
End: 2025-06-16
Payer: MEDICARE

## 2025-06-16 DIAGNOSIS — R00.1 BRADYCARDIA: ICD-10-CM

## 2025-06-16 DIAGNOSIS — E55.9 VITAMIN D DEFICIENCY: ICD-10-CM

## 2025-06-16 DIAGNOSIS — E78.2 MIXED HYPERLIPIDEMIA: ICD-10-CM

## 2025-06-16 DIAGNOSIS — K22.70 BARRETT'S ESOPHAGUS WITHOUT DYSPLASIA: ICD-10-CM

## 2025-06-16 DIAGNOSIS — R73.09 ELEVATED GLUCOSE: ICD-10-CM

## 2025-06-16 DIAGNOSIS — Z00.00 MEDICARE ANNUAL WELLNESS VISIT, SUBSEQUENT: ICD-10-CM

## 2025-06-16 DIAGNOSIS — N40.0 BENIGN PROSTATIC HYPERPLASIA WITHOUT LOWER URINARY TRACT SYMPTOMS: ICD-10-CM

## 2025-08-04 ENCOUNTER — TELEPHONE (OUTPATIENT)
Dept: PRIMARY CARE | Facility: CLINIC | Age: 70
End: 2025-08-04
Payer: MEDICARE

## 2025-08-06 LAB
Lab: ABNORMAL
NON-UH HIE A/G RATIO: 1.3
NON-UH HIE ALB: 4 G/DL (ref 3.4–5)
NON-UH HIE ALK PHOS: 84 UNIT/L (ref 45–117)
NON-UH HIE APPEARANCE, U: ABNORMAL
NON-UH HIE BASO COUNT: 0.06 X1000 (ref 0–0.2)
NON-UH HIE BASOS %: 1 %
NON-UH HIE BILIRUBIN, TOTAL: 1.6 MG/DL (ref 0.3–1.2)
NON-UH HIE BILIRUBIN, U: ABNORMAL
NON-UH HIE BLOOD, U: ABNORMAL
NON-UH HIE BUN/CREAT RATIO: 16.7
NON-UH HIE BUN: 15 MG/DL (ref 9–23)
NON-UH HIE CALCIUM: 10 MG/DL (ref 8.7–10.4)
NON-UH HIE CALCULATED LDL CHOLESTEROL: 50 MG/DL (ref 60–130)
NON-UH HIE CALCULATED OSMOLALITY: 288 MOSM/KG (ref 275–295)
NON-UH HIE CHLORIDE: 105 MMOL/L (ref 98–107)
NON-UH HIE CHOLESTEROL: 127 MG/DL (ref 100–200)
NON-UH HIE CO2, VENOUS: 31 MMOL/L (ref 20–31)
NON-UH HIE COLOR, U: ABNORMAL
NON-UH HIE CREATININE: 0.9 MG/DL (ref 0.6–1.1)
NON-UH HIE DIFF?: NORMAL
NON-UH HIE EOS COUNT: 0.16 X1000 (ref 0–0.5)
NON-UH HIE EOSIN %: 2.6 %
NON-UH HIE GFR AA: >60
NON-UH HIE GLOBULIN: 3 G/DL
NON-UH HIE GLOMERULAR FILTRATION RATE: >60 ML/MIN/1.73M?
NON-UH HIE GLUCOSE QUAL, U: ABNORMAL
NON-UH HIE GLUCOSE: 99 MG/DL (ref 74–106)
NON-UH HIE GOT: 25 UNIT/L (ref 15–37)
NON-UH HIE GPT: 66 UNIT/L (ref 10–49)
NON-UH HIE HCT: 44.9 % (ref 41–52)
NON-UH HIE HDL CHOLESTEROL: 38 MG/DL (ref 40–60)
NON-UH HIE HGB A1C: 5.3 %
NON-UH HIE HGB: 15.4 G/DL (ref 13.5–17.5)
NON-UH HIE INSTR WBC: 6.2
NON-UH HIE K: 3.7 MMOL/L (ref 3.5–5.1)
NON-UH HIE KETONES, U: ABNORMAL
NON-UH HIE LEUKOCYTE ESTERASE, U: ABNORMAL
NON-UH HIE LYMPH %: 35.3 %
NON-UH HIE LYMPH COUNT: 2.18 X1000 (ref 1.2–4.8)
NON-UH HIE MCH: 31.8 PG (ref 27–34)
NON-UH HIE MCHC: 34.3 G/DL (ref 32–37)
NON-UH HIE MCV: 92.6 FL (ref 80–100)
NON-UH HIE MONO %: 9.5 %
NON-UH HIE MONO COUNT: 0.59 X1000 (ref 0.1–1)
NON-UH HIE MPV: 8.8 FL (ref 7.4–10.4)
NON-UH HIE MUCOUS, U: ABNORMAL
NON-UH HIE NA: 144 MMOL/L (ref 135–145)
NON-UH HIE NEUTROPHIL %: 51.6 %
NON-UH HIE NEUTROPHIL COUNT (ANC): 3.18 X1000 (ref 1.4–8.8)
NON-UH HIE NITRITE, U: ABNORMAL
NON-UH HIE NUCLEATED RBC: 0 /100WBC
NON-UH HIE PH, U: 5.5 (ref 4.5–8)
NON-UH HIE PLATELET: 199 X10 (ref 150–450)
NON-UH HIE PROSTATIC SPECIFIC AG SCREEN: 2.1 NG/ML (ref 0–4)
NON-UH HIE PROTEIN, U: ABNORMAL
NON-UH HIE RBC/HPF, U: 1 #/HPF (ref 0–3)
NON-UH HIE RBC: 4.85 X10 (ref 4.7–6.1)
NON-UH HIE RDW: 14 % (ref 11.5–14.5)
NON-UH HIE SPECIFIC GRAVITY, U: 1.03 (ref 1–1.03)
NON-UH HIE TOTAL CHOL/HDL CHOL RATIO: 3.3
NON-UH HIE TOTAL PROTEIN: 7 G/DL (ref 5.7–8.2)
NON-UH HIE TRIGLYCERIDES: 196 MG/DL (ref 30–150)
NON-UH HIE TSH: 3.61 UIU/ML (ref 0.55–4.78)
NON-UH HIE U MICRO: ABNORMAL
NON-UH HIE UROBILINOGEN QUAL, U: ABNORMAL
NON-UH HIE VIT D 25: 39 NG/ML
NON-UH HIE WBC/HPF, U: 1 #/HPF (ref 0–5)
NON-UH HIE WBC: 6.2 X10 (ref 4.5–11)

## 2025-08-07 ENCOUNTER — RESULTS FOLLOW-UP (OUTPATIENT)
Dept: PRIMARY CARE | Facility: CLINIC | Age: 70
End: 2025-08-07
Payer: MEDICARE

## 2025-08-07 ENCOUNTER — TELEPHONE (OUTPATIENT)
Dept: PRIMARY CARE | Facility: CLINIC | Age: 70
End: 2025-08-07
Payer: MEDICARE

## 2025-08-07 NOTE — TELEPHONE ENCOUNTER
----- Message from Gabriel Pace sent at 8/7/2025  9:14 AM EDT -----  GPT 65 LDL 50 triglyceride 196 proteinuria A1c thyroid normal continue current medication advised to see Dr. Whatley for EGD colonoscopy follow-up in 4 weeks bring all medication with you get your   MMR vaccine from [facility] we will do EKG and hepatitis C screening next visit  ----- Message -----  From: Asim Pierson - Lab Results In  Sent: 8/6/2025  11:23 AM EDT  To: Gabriel Pace MD

## 2025-08-11 ENCOUNTER — APPOINTMENT (OUTPATIENT)
Dept: PRIMARY CARE | Facility: CLINIC | Age: 70
End: 2025-08-11
Payer: MEDICARE

## 2025-08-11 VITALS
DIASTOLIC BLOOD PRESSURE: 72 MMHG | SYSTOLIC BLOOD PRESSURE: 127 MMHG | WEIGHT: 162.7 LBS | HEART RATE: 62 BPM | BODY MASS INDEX: 24.1 KG/M2 | HEIGHT: 69 IN | OXYGEN SATURATION: 96 %

## 2025-08-11 DIAGNOSIS — N40.0 BENIGN PROSTATIC HYPERPLASIA WITHOUT LOWER URINARY TRACT SYMPTOMS: ICD-10-CM

## 2025-08-11 DIAGNOSIS — Z12.11 SCREENING FOR COLORECTAL CANCER: ICD-10-CM

## 2025-08-11 DIAGNOSIS — E78.2 MIXED HYPERLIPIDEMIA: ICD-10-CM

## 2025-08-11 DIAGNOSIS — R10.9 ABDOMINAL PAIN, UNSPECIFIED ABDOMINAL LOCATION: ICD-10-CM

## 2025-08-11 DIAGNOSIS — R53.81 CHRONIC FATIGUE AND MALAISE: ICD-10-CM

## 2025-08-11 DIAGNOSIS — Z00.00 MEDICARE ANNUAL WELLNESS VISIT, SUBSEQUENT: Primary | ICD-10-CM

## 2025-08-11 DIAGNOSIS — Z12.12 SCREENING FOR COLORECTAL CANCER: ICD-10-CM

## 2025-08-11 DIAGNOSIS — R53.82 CHRONIC FATIGUE AND MALAISE: ICD-10-CM

## 2025-08-11 DIAGNOSIS — Z11.59 NEED FOR HEPATITIS C SCREENING TEST: ICD-10-CM

## 2025-08-11 DIAGNOSIS — K76.0 FATTY LIVER: ICD-10-CM

## 2025-08-11 DIAGNOSIS — K22.70 BARRETT'S ESOPHAGUS WITHOUT DYSPLASIA: ICD-10-CM

## 2025-08-11 DIAGNOSIS — K21.00 GASTROESOPHAGEAL REFLUX DISEASE WITH ESOPHAGITIS WITHOUT HEMORRHAGE: ICD-10-CM

## 2025-08-11 PROBLEM — R00.1 BRADYCARDIA: Status: RESOLVED | Noted: 2024-04-04 | Resolved: 2025-08-11

## 2025-08-11 PROCEDURE — 3008F BODY MASS INDEX DOCD: CPT | Performed by: INTERNAL MEDICINE

## 2025-08-11 PROCEDURE — 1159F MED LIST DOCD IN RCRD: CPT | Performed by: INTERNAL MEDICINE

## 2025-08-11 PROCEDURE — 1157F ADVNC CARE PLAN IN RCRD: CPT | Performed by: INTERNAL MEDICINE

## 2025-08-11 PROCEDURE — 1160F RVW MEDS BY RX/DR IN RCRD: CPT | Performed by: INTERNAL MEDICINE

## 2025-08-11 PROCEDURE — 99214 OFFICE O/P EST MOD 30 MIN: CPT | Performed by: INTERNAL MEDICINE

## 2025-08-11 PROCEDURE — 1170F FXNL STATUS ASSESSED: CPT | Performed by: INTERNAL MEDICINE

## 2025-08-11 PROCEDURE — G0439 PPPS, SUBSEQ VISIT: HCPCS | Performed by: INTERNAL MEDICINE

## 2025-08-11 ASSESSMENT — ACTIVITIES OF DAILY LIVING (ADL)
DRESSING: INDEPENDENT
MANAGING_FINANCES: INDEPENDENT
BATHING: INDEPENDENT
TAKING_MEDICATION: INDEPENDENT
GROCERY_SHOPPING: INDEPENDENT
DOING_HOUSEWORK: INDEPENDENT

## 2025-08-11 ASSESSMENT — PATIENT HEALTH QUESTIONNAIRE - PHQ9
1. LITTLE INTEREST OR PLEASURE IN DOING THINGS: NOT AT ALL
2. FEELING DOWN, DEPRESSED OR HOPELESS: NOT AT ALL
SUM OF ALL RESPONSES TO PHQ9 QUESTIONS 1 AND 2: 0
2. FEELING DOWN, DEPRESSED OR HOPELESS: NOT AT ALL
1. LITTLE INTEREST OR PLEASURE IN DOING THINGS: NOT AT ALL
SUM OF ALL RESPONSES TO PHQ9 QUESTIONS 1 AND 2: 0

## 2025-08-15 LAB
NON-UH HIE APPEARANCE, U: NORMAL
NON-UH HIE BILIRUBIN, U: NORMAL
NON-UH HIE BLOOD, U: NORMAL
NON-UH HIE COLOR, U: NORMAL
NON-UH HIE GLUCOSE QUAL, U: NORMAL
NON-UH HIE HEPATITIS C ANTIBODY: NORMAL
NON-UH HIE KETONES, U: NORMAL
NON-UH HIE LEUKOCYTE ESTERASE, U: NORMAL
NON-UH HIE NITRITE, U: NORMAL
NON-UH HIE PH, U: 6.5 (ref 4.5–8)
NON-UH HIE PROTEIN, U: NORMAL
NON-UH HIE SPECIFIC GRAVITY, U: 1.02 (ref 1–1.03)
NON-UH HIE U MICRO: NORMAL
NON-UH HIE UROBILINOGEN QUAL, U: NORMAL

## 2025-08-19 ENCOUNTER — RESULTS FOLLOW-UP (OUTPATIENT)
Dept: PRIMARY CARE | Facility: CLINIC | Age: 70
End: 2025-08-19
Payer: MEDICARE

## 2025-08-28 ENCOUNTER — OFFICE VISIT (OUTPATIENT)
Dept: SURGERY | Facility: CLINIC | Age: 70
End: 2025-08-28
Payer: MEDICARE

## 2025-08-28 DIAGNOSIS — K82.4 GALLBLADDER POLYP: ICD-10-CM

## 2025-08-28 PROCEDURE — 1160F RVW MEDS BY RX/DR IN RCRD: CPT | Performed by: SURGERY

## 2025-08-28 PROCEDURE — 1159F MED LIST DOCD IN RCRD: CPT | Performed by: SURGERY

## 2025-08-28 PROCEDURE — 1036F TOBACCO NON-USER: CPT | Performed by: SURGERY

## 2025-08-28 PROCEDURE — 99214 OFFICE O/P EST MOD 30 MIN: CPT | Performed by: SURGERY

## 2025-09-18 ENCOUNTER — APPOINTMENT (OUTPATIENT)
Dept: SURGERY | Facility: CLINIC | Age: 70
End: 2025-09-18
Payer: MEDICARE

## 2026-02-10 ENCOUNTER — APPOINTMENT (OUTPATIENT)
Dept: PRIMARY CARE | Facility: CLINIC | Age: 71
End: 2026-02-10
Payer: MEDICARE